# Patient Record
Sex: MALE | Race: BLACK OR AFRICAN AMERICAN | Employment: OTHER | URBAN - METROPOLITAN AREA
[De-identification: names, ages, dates, MRNs, and addresses within clinical notes are randomized per-mention and may not be internally consistent; named-entity substitution may affect disease eponyms.]

---

## 2017-03-02 ENCOUNTER — HOSPITAL ENCOUNTER (EMERGENCY)
Age: 37
Discharge: HOME | End: 2017-03-02
Attending: STUDENT IN AN ORGANIZED HEALTH CARE EDUCATION/TRAINING PROGRAM
Payer: COMMERCIAL

## 2017-03-02 VITALS
BODY MASS INDEX: 37.51 KG/M2 | HEART RATE: 83 BPM | RESPIRATION RATE: 17 BRPM | DIASTOLIC BLOOD PRESSURE: 97 MMHG | TEMPERATURE: 97.9 F | WEIGHT: 283 LBS | SYSTOLIC BLOOD PRESSURE: 164 MMHG | HEIGHT: 73 IN | OXYGEN SATURATION: 99 %

## 2017-03-02 DIAGNOSIS — R60.9 PERIPHERAL EDEMA: Primary | ICD-10-CM

## 2017-03-02 DIAGNOSIS — R35.0 URINARY FREQUENCY: ICD-10-CM

## 2017-03-02 DIAGNOSIS — F17.200 TOBACCO DEPENDENCE: ICD-10-CM

## 2017-03-02 LAB
CHP ED QC CHECK: YES
GLUCOSE BLD-MCNC: 92 MG/DL
GLUCOSE BLD-MCNC: 92 MG/DL (ref 60–115)
PERFORMED ON: NORMAL

## 2017-03-02 PROCEDURE — 99283 EMERGENCY DEPT VISIT LOW MDM: CPT

## 2017-03-02 RX ORDER — NAPROXEN 500 MG/1
500 TABLET ORAL 2 TIMES DAILY
Qty: 20 TABLET | Refills: 0 | Status: SHIPPED | OUTPATIENT
Start: 2017-03-02 | End: 2017-06-22

## 2017-03-02 ASSESSMENT — ENCOUNTER SYMPTOMS
TROUBLE SWALLOWING: 0
VOMITING: 0
DIARRHEA: 0
BACK PAIN: 0
ABDOMINAL PAIN: 0
SINUS PRESSURE: 0
COUGH: 0
CHEST TIGHTNESS: 0
SHORTNESS OF BREATH: 0

## 2017-06-22 ENCOUNTER — APPOINTMENT (OUTPATIENT)
Dept: GENERAL RADIOLOGY | Age: 37
End: 2017-06-22
Payer: COMMERCIAL

## 2017-06-22 ENCOUNTER — HOSPITAL ENCOUNTER (EMERGENCY)
Age: 37
Discharge: HOME | End: 2017-06-22
Payer: COMMERCIAL

## 2017-06-22 VITALS
OXYGEN SATURATION: 99 % | DIASTOLIC BLOOD PRESSURE: 83 MMHG | SYSTOLIC BLOOD PRESSURE: 122 MMHG | BODY MASS INDEX: 38.3 KG/M2 | HEART RATE: 95 BPM | WEIGHT: 289 LBS | TEMPERATURE: 98 F | HEIGHT: 73 IN | RESPIRATION RATE: 16 BRPM

## 2017-06-22 DIAGNOSIS — M25.512 ACUTE PAIN OF LEFT SHOULDER: Primary | ICD-10-CM

## 2017-06-22 PROCEDURE — 6370000000 HC RX 637 (ALT 250 FOR IP): Performed by: PHYSICIAN ASSISTANT

## 2017-06-22 PROCEDURE — 73030 X-RAY EXAM OF SHOULDER: CPT

## 2017-06-22 PROCEDURE — 99283 EMERGENCY DEPT VISIT LOW MDM: CPT

## 2017-06-22 RX ORDER — NAPROXEN 500 MG/1
500 TABLET ORAL ONCE
Status: COMPLETED | OUTPATIENT
Start: 2017-06-22 | End: 2017-06-22

## 2017-06-22 RX ORDER — NAPROXEN 500 MG/1
500 TABLET ORAL ONCE
Status: DISCONTINUED | OUTPATIENT
Start: 2017-06-22 | End: 2017-06-22 | Stop reason: HOSPADM

## 2017-06-22 RX ORDER — MELOXICAM 7.5 MG/1
7.5 TABLET ORAL DAILY
Qty: 14 TABLET | Refills: 0 | Status: SHIPPED | OUTPATIENT
Start: 2017-06-22 | End: 2020-04-26

## 2017-06-22 RX ADMIN — NAPROXEN 500 MG: 500 TABLET ORAL at 15:03

## 2017-06-22 ASSESSMENT — ENCOUNTER SYMPTOMS
EYES NEGATIVE: 1
RESPIRATORY NEGATIVE: 1
GASTROINTESTINAL NEGATIVE: 1

## 2017-06-22 ASSESSMENT — PAIN SCALES - GENERAL
PAINLEVEL_OUTOF10: 10
PAINLEVEL_OUTOF10: 10

## 2017-06-22 ASSESSMENT — PAIN DESCRIPTION - LOCATION: LOCATION: SHOULDER

## 2017-06-22 ASSESSMENT — PAIN DESCRIPTION - ORIENTATION: ORIENTATION: RIGHT;LEFT

## 2017-06-22 ASSESSMENT — PAIN DESCRIPTION - PAIN TYPE: TYPE: ACUTE PAIN

## 2017-06-22 ASSESSMENT — PAIN DESCRIPTION - DESCRIPTORS: DESCRIPTORS: PINS AND NEEDLES

## 2017-11-22 ENCOUNTER — HOSPITAL ENCOUNTER (EMERGENCY)
Age: 37
Discharge: HOME | End: 2017-11-22
Payer: COMMERCIAL

## 2017-11-22 VITALS
HEART RATE: 101 BPM | RESPIRATION RATE: 20 BRPM | TEMPERATURE: 97.9 F | WEIGHT: 270 LBS | DIASTOLIC BLOOD PRESSURE: 89 MMHG | SYSTOLIC BLOOD PRESSURE: 138 MMHG | OXYGEN SATURATION: 97 % | BODY MASS INDEX: 35.78 KG/M2 | HEIGHT: 73 IN

## 2017-11-22 DIAGNOSIS — M54.6 ACUTE LEFT-SIDED THORACIC BACK PAIN: Primary | ICD-10-CM

## 2017-11-22 PROCEDURE — 96372 THER/PROPH/DIAG INJ SC/IM: CPT

## 2017-11-22 PROCEDURE — 6370000000 HC RX 637 (ALT 250 FOR IP): Performed by: NURSE PRACTITIONER

## 2017-11-22 PROCEDURE — 99283 EMERGENCY DEPT VISIT LOW MDM: CPT

## 2017-11-22 PROCEDURE — 6360000002 HC RX W HCPCS: Performed by: NURSE PRACTITIONER

## 2017-11-22 RX ORDER — KETOROLAC TROMETHAMINE 30 MG/ML
60 INJECTION, SOLUTION INTRAMUSCULAR; INTRAVENOUS ONCE
Status: COMPLETED | OUTPATIENT
Start: 2017-11-22 | End: 2017-11-22

## 2017-11-22 RX ORDER — TRAMADOL HYDROCHLORIDE 50 MG/1
50 TABLET ORAL EVERY 4 HOURS PRN
Qty: 10 TABLET | Refills: 0 | Status: SHIPPED | OUTPATIENT
Start: 2017-11-22 | End: 2017-12-02

## 2017-11-22 RX ORDER — ORPHENADRINE CITRATE 30 MG/ML
60 INJECTION INTRAMUSCULAR; INTRAVENOUS ONCE
Status: COMPLETED | OUTPATIENT
Start: 2017-11-22 | End: 2017-11-22

## 2017-11-22 RX ORDER — ASPIRIN 81 MG
1 TABLET,CHEWABLE ORAL 3 TIMES DAILY PRN
Qty: 42.5 G | Refills: 0 | Status: SHIPPED | OUTPATIENT
Start: 2017-11-22

## 2017-11-22 RX ORDER — IBUPROFEN 800 MG/1
800 TABLET ORAL EVERY 8 HOURS PRN
Qty: 20 TABLET | Refills: 0 | Status: SHIPPED | OUTPATIENT
Start: 2017-11-22 | End: 2020-04-26

## 2017-11-22 RX ORDER — CYCLOBENZAPRINE HCL 10 MG
10 TABLET ORAL 3 TIMES DAILY PRN
Qty: 10 TABLET | Refills: 0 | Status: SHIPPED | OUTPATIENT
Start: 2017-11-22 | End: 2017-12-02

## 2017-11-22 RX ORDER — OXYCODONE HYDROCHLORIDE AND ACETAMINOPHEN 5; 325 MG/1; MG/1
1 TABLET ORAL ONCE
Status: COMPLETED | OUTPATIENT
Start: 2017-11-22 | End: 2017-11-22

## 2017-11-22 RX ADMIN — OXYCODONE HYDROCHLORIDE AND ACETAMINOPHEN 1 TABLET: 5; 325 TABLET ORAL at 09:25

## 2017-11-22 RX ADMIN — KETOROLAC TROMETHAMINE 60 MG: 30 INJECTION, SOLUTION INTRAMUSCULAR at 09:25

## 2017-11-22 RX ADMIN — ORPHENADRINE CITRATE 60 MG: 30 INJECTION INTRAMUSCULAR; INTRAVENOUS at 09:25

## 2017-11-22 ASSESSMENT — ENCOUNTER SYMPTOMS
BACK PAIN: 1
VOMITING: 0
DIARRHEA: 0
SHORTNESS OF BREATH: 0
COUGH: 0
NAUSEA: 0
ABDOMINAL PAIN: 0

## 2017-11-22 ASSESSMENT — PAIN DESCRIPTION - DESCRIPTORS: DESCRIPTORS: STABBING

## 2017-11-22 ASSESSMENT — PAIN DESCRIPTION - PAIN TYPE
TYPE: ACUTE PAIN
TYPE: ACUTE PAIN

## 2017-11-22 ASSESSMENT — PAIN DESCRIPTION - PROGRESSION: CLINICAL_PROGRESSION: GRADUALLY IMPROVING

## 2017-11-22 ASSESSMENT — PAIN DESCRIPTION - LOCATION
LOCATION: BACK;NECK
LOCATION: BACK

## 2017-11-22 ASSESSMENT — PAIN SCALES - GENERAL
PAINLEVEL_OUTOF10: 8
PAINLEVEL_OUTOF10: 4
PAINLEVEL_OUTOF10: 10
PAINLEVEL_OUTOF10: 10

## 2017-11-22 NOTE — ED TRIAGE NOTES
Pt c/o waking up a few days ago and having neck pain that moves down in the left mid back. Lump noted on the back. Pt denies injury. States he fell asleep with his baby in his arms and woke up  With the pain.  Pt unable to work as movement and taking a deep breath  Causes increase in pain

## 2017-11-22 NOTE — ED PROVIDER NOTES
new or concerning symptoms. Patient demonstrates understanding. PROCEDURES:    Procedures      FINAL IMPRESSION      1. Acute left-sided thoracic back pain          DISPOSITION/PLAN   DISPOSITION Decision to Discharge    PATIENT REFERRED TO:  Clarisse Chandler MD  [address] [place] [address]  [phone]            DISCHARGE MEDICATIONS:  New Prescriptions    CAPSAICIN 0.1 % CREA    Apply 1 applicator topically 3 times daily as needed (pain)    CYCLOBENZAPRINE (FLEXERIL) 10 MG TABLET    Take 1 tablet by mouth 3 times daily as needed for Muscle spasms    IBUPROFEN (ADVIL;MOTRIN) 800 MG TABLET    Take 1 tablet by mouth every 8 hours as needed for Pain    TRAMADOL (ULTRAM) 50 MG TABLET    Take 1 tablet by mouth every 4 hours as needed for Pain (MAY TAKE 2 TABS EVERY 6 HOURS FOR SEVERE PAIN) .        (Please note that portions of this note were completed with a voice recognition program.  Efforts were made to edit the dictations but occasionally words are mis-transcribed.)    Bulmaro Mosqueda, [address] [facility], [facility]  11/22/17 0581

## 2020-01-20 ENCOUNTER — HOSPITAL ENCOUNTER (EMERGENCY)
Age: 40
Discharge: HOME | End: 2020-01-20
Attending: EMERGENCY MEDICINE

## 2020-01-20 ENCOUNTER — APPOINTMENT (OUTPATIENT)
Dept: GENERAL RADIOLOGY | Age: 40
End: 2020-01-20

## 2020-01-20 VITALS
DIASTOLIC BLOOD PRESSURE: 84 MMHG | OXYGEN SATURATION: 98 % | HEIGHT: 73 IN | HEART RATE: 93 BPM | TEMPERATURE: 97.3 F | RESPIRATION RATE: 18 BRPM | SYSTOLIC BLOOD PRESSURE: 155 MMHG | WEIGHT: 270 LBS | BODY MASS INDEX: 35.78 KG/M2

## 2020-01-20 PROCEDURE — 99283 EMERGENCY DEPT VISIT LOW MDM: CPT

## 2020-01-20 PROCEDURE — 73630 X-RAY EXAM OF FOOT: CPT

## 2020-01-20 ASSESSMENT — PAIN SCALES - GENERAL: PAINLEVEL_OUTOF10: 10

## 2020-01-20 ASSESSMENT — PAIN DESCRIPTION - LOCATION: LOCATION: FOOT

## 2020-01-20 ASSESSMENT — ENCOUNTER SYMPTOMS
RHINORRHEA: 0
SHORTNESS OF BREATH: 0
ABDOMINAL PAIN: 0
EYE DISCHARGE: 0
FACIAL SWELLING: 0
COLOR CHANGE: 0
VOMITING: 0

## 2020-01-20 ASSESSMENT — PAIN DESCRIPTION - PAIN TYPE: TYPE: ACUTE PAIN

## 2020-01-20 ASSESSMENT — PAIN DESCRIPTION - ORIENTATION: ORIENTATION: LEFT

## 2020-01-20 NOTE — ED PROVIDER NOTES
[facility] [facility] ED  eMERGENCY dEPARTMENT eNCOUnter      Pt Name: Win Mac  MRN: [id number]  [place] 1980  Date of evaluation: 1/20/2020  Provider: Bonita Lees, [address] [place] [address]       Chief Complaint   Patient presents with    Foot Pain     left         HISTORY OF PRESENT ILLNESS   (Location/Symptom, Timing/Onset,Context/Setting, Quality, Duration, Modifying Factors, Severity)  Note limiting factors. Win Mac is a 35 y.o. male who presents to the emergency department with a chief complaint of left foot pain. He has known fungal infection between the fourth and fifth digits of his left foot and saw podiatry for last week. He is having pain in this area. He states that there has been swelling there and he feels like there is still it is. Overall he feels like the open area between his toes there is getting better. He has been following instructions as per podiatry. He is tender in this area however and states that the last time they gave him a boot and this took the pressure off the area and he felt like it healed faster and he was not in pain. He is asking for the same as he cannot find the boot any longer. He has been using a sandal because he cannot fit his foot into his shoe right now and wrapping it and plastic because of the snow. HPI    NursingNotes were reviewed. REVIEW OF SYSTEMS    (2-9 systems for level 4, 10 or more for level 5)     Review of Systems   Constitutional: Negative for activity change, appetite change and fatigue. HENT: Negative for congestion, facial swelling and rhinorrhea. Eyes: Negative for discharge. Respiratory: Negative for shortness of breath. Cardiovascular: Negative for chest pain. Gastrointestinal: Negative for abdominal pain and vomiting. Endocrine: Negative for cold intolerance. Musculoskeletal: Positive for gait problem. Negative for arthralgias and myalgias. Skin: Negative for color change. Allergic/Immunologic: Negative for environmental allergies. Neurological: Negative for dizziness and light-headedness. Hematological: Negative for adenopathy. Psychiatric/Behavioral: Negative for behavioral problems. Except as noted above the remainder of the review of systems was reviewed and negative. PAST MEDICAL HISTORY   History reviewed. No pertinent past medical history. SURGICALHISTORY     History reviewed. No pertinent surgical history. CURRENT MEDICATIONS       Previous Medications    CAPSAICIN 0.1 % CREA    Apply 1 applicator topically 3 times daily as needed (pain)    IBUPROFEN (ADVIL;MOTRIN) 800 MG TABLET    Take 1 tablet by mouth every 8 hours as needed for Pain    MELOXICAM (MOBIC) 7.5 MG TABLET    Take 1 tablet by mouth daily       ALLERGIES     Lactose intolerance (gi)    FAMILY HISTORY     History reviewed. No pertinent family history.        SOCIAL HISTORY       Social History     Socioeconomic History    Marital status: Single     Spouse name: None    Number of children: None    Years of education: None    Highest education level: None   Occupational History    None   Social Needs    Financial resource strain: None    Food insecurity:     Worry: None     Inability: None    Transportation needs:     Medical: None     Non-medical: None   Tobacco Use    Smoking status: Current Every Day Smoker     Packs/day: 0.50     Types: Cigarettes   Substance and Sexual Activity    Alcohol use: Yes     Comment: socially    Drug use: Yes     Frequency: 7.0 times per week     Types: Marijuana    Sexual activity: Yes     Partners: Female   Lifestyle    Physical activity:     Days per week: None     Minutes per session: None    Stress: None   Relationships    Social connections:     Talks on phone: None     Gets together: None     Attends [employer] service: None     Active member of club or organization: None     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        RADIOLOGY:   Ainsley Mor such as CT, Ultrasound and MRI are read by the radiologist. Plain radiographic images are visualized and preliminarily interpreted by the emergency physician with the below findings:    No acute findings per my read    Interpretation per the Radiologist below, if available at the time ofthis note:    XR FOOT LEFT (MIN 3 VIEWS)    (Results Pending)         ED BEDSIDE ULTRASOUND:   Performed by ED Physician - none    LABS:  Labs Reviewed - No data to display    All other labs were within normal range or not returned as of this dictation. EMERGENCY DEPARTMENT COURSE and DIFFERENTIAL DIAGNOSIS/MDM:   Vitals:    Vitals:    01/20/20 0712   BP: (!) 155/84   Pulse: 93   Resp: 18   Temp: 97.3 °F (36.3 °C)   TempSrc: Oral   SpO2: 98%   Weight: 270 lb (122.5 kg)   Height: 6' 1\" (1.854 m)       Notes are reviewed from the podiatry visit recently and their plan is reviewed with the patient, he seems to be following it accordingly. His symptoms seem to be improving rather than worsening, I feel that he is just seeking support regarding the pain and ambulating during the healing process. He is not asking for pain medicine. I will check an x-ray to be certain there is nothing unusual and provide him with a temporary solution of a post-op shoe for now. He has a plan to follow up with podiatry and can call them sooner for a better boot request should he need    MDM    CRITICAL CARE TIME   Total Critical Care time was 0 minutes, excluding separately reportableprocedures. There was a high probability of clinicallysignificant/life threatening deterioration in the patient's condition which required my urgent intervention. CONSULTS:  None    PROCEDURES:  Unless otherwise noted below, none     Procedures    FINAL IMPRESSION      1.  Left foot pain          DISPOSITION/PLAN   DISPOSITION Decision To Discharge 01/20/2020 08:03:55 AM      PATIENT REFERRED TO:    podiatry as

## 2020-01-20 NOTE — ED NOTES
Patient given DC instructions and education  Post Op boot applied to left foot  Patient to follow with podiatry  Denies having any questions at time of DC  Up with steady gait      Pasquale Mera RN  01/20/20 0630

## 2020-04-26 ENCOUNTER — HOSPITAL ENCOUNTER (EMERGENCY)
Age: 40
Discharge: HOME | End: 2020-04-26

## 2020-04-26 ENCOUNTER — APPOINTMENT (OUTPATIENT)
Dept: GENERAL RADIOLOGY | Age: 40
End: 2020-04-26

## 2020-04-26 VITALS
WEIGHT: 270 LBS | RESPIRATION RATE: 16 BRPM | OXYGEN SATURATION: 98 % | DIASTOLIC BLOOD PRESSURE: 93 MMHG | SYSTOLIC BLOOD PRESSURE: 163 MMHG | HEIGHT: 73 IN | TEMPERATURE: 98.4 F | BODY MASS INDEX: 35.78 KG/M2 | HEART RATE: 94 BPM

## 2020-04-26 LAB — URIC ACID, SERUM: 6.5 MG/DL (ref 3.4–7)

## 2020-04-26 PROCEDURE — 36415 COLL VENOUS BLD VENIPUNCTURE: CPT

## 2020-04-26 PROCEDURE — 6370000000 HC RX 637 (ALT 250 FOR IP): Performed by: PHYSICIAN ASSISTANT

## 2020-04-26 PROCEDURE — 99283 EMERGENCY DEPT VISIT LOW MDM: CPT

## 2020-04-26 PROCEDURE — 84550 ASSAY OF BLOOD/URIC ACID: CPT

## 2020-04-26 PROCEDURE — 73610 X-RAY EXAM OF ANKLE: CPT

## 2020-04-26 PROCEDURE — 73630 X-RAY EXAM OF FOOT: CPT

## 2020-04-26 RX ORDER — NAPROXEN 500 MG/1
500 TABLET ORAL 2 TIMES DAILY WITH MEALS
Qty: 14 TABLET | Refills: 0 | Status: SHIPPED | OUTPATIENT
Start: 2020-04-26

## 2020-04-26 RX ORDER — NAPROXEN 500 MG/1
500 TABLET ORAL ONCE
Status: COMPLETED | OUTPATIENT
Start: 2020-04-26 | End: 2020-04-26

## 2020-04-26 RX ADMIN — NAPROXEN 500 MG: 500 TABLET ORAL at 11:40

## 2020-04-26 ASSESSMENT — PAIN SCALES - GENERAL
PAINLEVEL_OUTOF10: 10
PAINLEVEL_OUTOF10: 10
PAINLEVEL_OUTOF10: 5

## 2020-04-26 ASSESSMENT — ENCOUNTER SYMPTOMS
EYES NEGATIVE: 1
GASTROINTESTINAL NEGATIVE: 1
RESPIRATORY NEGATIVE: 1

## 2020-04-26 ASSESSMENT — PAIN DESCRIPTION - ORIENTATION
ORIENTATION: RIGHT
ORIENTATION: RIGHT

## 2020-04-26 ASSESSMENT — PAIN DESCRIPTION - ONSET: ONSET: AWAKENED FROM SLEEP

## 2020-04-26 ASSESSMENT — PAIN DESCRIPTION - LOCATION
LOCATION: ANKLE
LOCATION: ANKLE

## 2020-04-26 ASSESSMENT — PAIN DESCRIPTION - DESCRIPTORS
DESCRIPTORS: ACHING
DESCRIPTORS: ACHING

## 2020-04-26 ASSESSMENT — PAIN DESCRIPTION - FREQUENCY
FREQUENCY: CONTINUOUS
FREQUENCY: CONTINUOUS

## 2020-04-26 ASSESSMENT — PAIN DESCRIPTION - PAIN TYPE
TYPE: ACUTE PAIN
TYPE: ACUTE PAIN

## 2020-04-26 NOTE — ED PROVIDER NOTES
Tenderness: There is no abdominal tenderness. Musculoskeletal: Normal range of motion. Right ankle: He exhibits swelling. Tenderness. Lateral malleolus tenderness found. Right foot: Tenderness and bony tenderness present. Skin:     General: Skin is warm and dry. Findings: No erythema or rash. Neurological:      Mental Status: He is alert and oriented to person, place, and time. Cranial Nerves: No cranial nerve deficit. Psychiatric:         Judgment: Judgment normal.           All other labs were within normal range or not returned as of this dictation. EMERGENCY DEPARTMENT COURSE and DIFFERENTIALDIAGNOSIS/MDM:   Vitals:    Vitals:    04/26/20 1119   BP: (!) 163/93   Pulse: 94   Resp: 16   Temp: 98.4 °F (36.9 °C)   TempSrc: Oral   SpO2: 98%   Weight: 270 lb (122.5 kg)   Height: 6' 1\" (1.854 m)          Patient given Naprosyn for pain while in the emergency room for initial treatment. Uric acid level is within normal limits. X-ray is negative for acute abnormality. Patient will be sent home on ankle brace and crutches for stabilization and comfort. Follow-up with orthopedics if symptoms persist.  Return here if symptoms worsen or if new concerning symptoms arise. Patient verbalizes understanding of plan discharge and has no further questions. PROCEDURES:  Unless otherwise noted below, none     Procedures      FINAL IMPRESSION      1. Sprain of right ankle, unspecified ligament, initial encounter    2.  Foot sprain, left, initial encounter          DISPOSITION/PLAN   DISPOSITION Discharge - Pending Orders Complete 04/26/2020 12:25:30 PM          Ashley Sharif PA-C (electronically signed)  Attending Emergency Physician  [address] [facility]  04/26/20 1257

## 2020-04-26 NOTE — ED NOTES
Air cast applied per orders, MSPs intact, crutch demo given and returned correctly,      Debi Reynolds, RN  04/26/20 6690

## 2023-04-06 ENCOUNTER — OFFICE VISIT (OUTPATIENT)
Dept: FAMILY MEDICINE CLINIC | Age: 43
End: 2023-04-06
Payer: COMMERCIAL

## 2023-04-06 VITALS
HEART RATE: 94 BPM | RESPIRATION RATE: 16 BRPM | SYSTOLIC BLOOD PRESSURE: 118 MMHG | BODY MASS INDEX: 37.24 KG/M2 | WEIGHT: 281 LBS | DIASTOLIC BLOOD PRESSURE: 70 MMHG | HEIGHT: 73 IN | OXYGEN SATURATION: 98 % | TEMPERATURE: 97.5 F

## 2023-04-06 DIAGNOSIS — Z87.891 PERSONAL HISTORY OF TOBACCO USE, PRESENTING HAZARDS TO HEALTH: ICD-10-CM

## 2023-04-06 DIAGNOSIS — Z00.00 ENCOUNTER FOR WELL ADULT EXAM WITHOUT ABNORMAL FINDINGS: Primary | ICD-10-CM

## 2023-04-06 DIAGNOSIS — R74.8 ABNORMAL LEVELS OF OTHER SERUM ENZYMES: ICD-10-CM

## 2023-04-06 DIAGNOSIS — B35.3 TINEA PEDIS OF BOTH FEET: ICD-10-CM

## 2023-04-06 PROBLEM — G89.29 CHRONIC PAIN OF BOTH SHOULDERS: Status: ACTIVE | Noted: 2020-02-11

## 2023-04-06 PROBLEM — M25.511 CHRONIC PAIN OF BOTH SHOULDERS: Status: ACTIVE | Noted: 2020-02-11

## 2023-04-06 PROBLEM — M25.512 CHRONIC PAIN OF BOTH SHOULDERS: Status: ACTIVE | Noted: 2020-02-11

## 2023-04-06 LAB
ALBUMIN SERPL-MCNC: 4.1 G/DL (ref 3.5–5.2)
ALP SERPL-CCNC: 96 U/L (ref 40–129)
ALT SERPL-CCNC: 43 U/L (ref 0–40)
ANION GAP SERPL CALCULATED.3IONS-SCNC: 9 MMOL/L (ref 7–16)
AST SERPL-CCNC: 40 U/L (ref 0–39)
BILIRUB SERPL-MCNC: <0.2 MG/DL (ref 0–1.2)
BUN SERPL-MCNC: 16 MG/DL (ref 6–20)
CALCIUM SERPL-MCNC: 9.2 MG/DL (ref 8.6–10.2)
CHLORIDE SERPL-SCNC: 106 MMOL/L (ref 98–107)
CHOLESTEROL, FASTING: 188 MG/DL (ref 0–199)
CO2 SERPL-SCNC: 23 MMOL/L (ref 22–29)
CREAT SERPL-MCNC: 1.3 MG/DL (ref 0.7–1.2)
ERYTHROCYTE [DISTWIDTH] IN BLOOD BY AUTOMATED COUNT: 12.7 FL (ref 11.5–15)
GLUCOSE FASTING: 104 MG/DL (ref 74–99)
HCT VFR BLD AUTO: 48.8 % (ref 37–54)
HDLC SERPL-MCNC: 49 MG/DL
HGB BLD-MCNC: 14.6 G/DL (ref 12.5–16.5)
LDL CHOLESTEROL CALCULATED: 108 MG/DL (ref 0–99)
MCH RBC QN AUTO: 32.3 PG (ref 26–35)
MCHC RBC AUTO-ENTMCNC: 29.9 % (ref 32–34.5)
MCV RBC AUTO: 108 FL (ref 80–99.9)
PLATELET # BLD AUTO: 180 E9/L (ref 130–450)
PMV BLD AUTO: 13 FL (ref 7–12)
POTASSIUM SERPL-SCNC: 4.6 MMOL/L (ref 3.5–5)
PROT SERPL-MCNC: 7 G/DL (ref 6.4–8.3)
RBC # BLD AUTO: 4.52 E12/L (ref 3.8–5.8)
SODIUM SERPL-SCNC: 138 MMOL/L (ref 132–146)
TRIGLYCERIDE, FASTING: 156 MG/DL (ref 0–149)
TSH SERPL-MCNC: 1.44 UIU/ML (ref 0.27–4.2)
VLDLC SERPL CALC-MCNC: 31 MG/DL
WBC # BLD: 4.7 E9/L (ref 4.5–11.5)

## 2023-04-06 PROCEDURE — 99386 PREV VISIT NEW AGE 40-64: CPT | Performed by: STUDENT IN AN ORGANIZED HEALTH CARE EDUCATION/TRAINING PROGRAM

## 2023-04-06 RX ORDER — NYSTATIN 100000 [USP'U]/G
1 POWDER TOPICAL 4 TIMES DAILY
COMMUNITY
Start: 2021-01-04 | End: 2023-04-06 | Stop reason: SDUPTHER

## 2023-04-06 RX ORDER — NYSTATIN 100000 [USP'U]/G
POWDER TOPICAL 4 TIMES DAILY
Qty: 60 G | Refills: 3 | Status: SHIPPED | OUTPATIENT
Start: 2023-04-06

## 2023-04-06 SDOH — ECONOMIC STABILITY: FOOD INSECURITY: WITHIN THE PAST 12 MONTHS, YOU WORRIED THAT YOUR FOOD WOULD RUN OUT BEFORE YOU GOT MONEY TO BUY MORE.: NEVER TRUE

## 2023-04-06 SDOH — ECONOMIC STABILITY: HOUSING INSECURITY
IN THE LAST 12 MONTHS, WAS THERE A TIME WHEN YOU DID NOT HAVE A STEADY PLACE TO SLEEP OR SLEPT IN A SHELTER (INCLUDING NOW)?: NO

## 2023-04-06 SDOH — ECONOMIC STABILITY: FOOD INSECURITY: WITHIN THE PAST 12 MONTHS, THE FOOD YOU BOUGHT JUST DIDN'T LAST AND YOU DIDN'T HAVE MONEY TO GET MORE.: NEVER TRUE

## 2023-04-06 SDOH — ECONOMIC STABILITY: INCOME INSECURITY: HOW HARD IS IT FOR YOU TO PAY FOR THE VERY BASICS LIKE FOOD, HOUSING, MEDICAL CARE, AND HEATING?: NOT HARD AT ALL

## 2023-04-06 ASSESSMENT — ENCOUNTER SYMPTOMS
CHEST TIGHTNESS: 0
EYE PAIN: 0
BACK PAIN: 0
DIARRHEA: 0
COUGH: 0
ABDOMINAL PAIN: 0
SHORTNESS OF BREATH: 0
RHINORRHEA: 0
CONSTIPATION: 0
NAUSEA: 0
VOMITING: 0

## 2023-04-06 ASSESSMENT — PATIENT HEALTH QUESTIONNAIRE - PHQ9
SUM OF ALL RESPONSES TO PHQ QUESTIONS 1-9: 0
SUM OF ALL RESPONSES TO PHQ QUESTIONS 1-9: 0
1. LITTLE INTEREST OR PLEASURE IN DOING THINGS: 0
2. FEELING DOWN, DEPRESSED OR HOPELESS: 0
SUM OF ALL RESPONSES TO PHQ QUESTIONS 1-9: 0
SUM OF ALL RESPONSES TO PHQ9 QUESTIONS 1 & 2: 0
SUM OF ALL RESPONSES TO PHQ QUESTIONS 1-9: 0

## 2023-04-06 ASSESSMENT — LIFESTYLE VARIABLES
HOW MANY STANDARD DRINKS CONTAINING ALCOHOL DO YOU HAVE ON A TYPICAL DAY: 1 OR 2
HOW OFTEN DO YOU HAVE A DRINK CONTAINING ALCOHOL: MONTHLY OR LESS

## 2023-04-06 NOTE — PATIENT INSTRUCTIONS
choices, such as nicotine patches, gum, and lozenges. After you quit, do not use tobacco again, not even once. Get rid of all tobacco products and anything that reminds you of tobacco, such as ashtrays. Avoid things that make you reach for tobacco.  Change your daily routine. Take a different route to work, or eat a meal in a different place. Try to cut down on stress. Find ways to calm yourself, such as taking a hot bath or doing deep breathing exercises. Eat a healthy diet, and get regular exercise. Having healthy habits may help you quit using tobacco.     Don't give up on quitting if you use tobacco again. Most people quit and restart a few times before they quit for good. Follow-up care is a key part of your treatment and safety. Be sure to make and go to all appointments, and call your doctor if you are having problems. It's also a good idea to know your test results and keep a list of the medicines you take. Where can you learn more? Go to http://www.woods.com/ and enter Y522 to learn more about \"Quitting Tobacco: Care Instructions. \"  Current as of: August 2, 2022               Content Version: 13.6  © 2820-4504 Healthwise, [employer]. Care instructions adapted under license by [facility] ([facility]). If you have questions about a medical condition or this instruction, always ask your healthcare professional. [place] [address] any warranty or liability for your use of this information. Learning About Benefits From Quitting Smoking  Why is it important to quit smoking? If you're thinking about quitting smoking, you may have a few reasons to be smoke-free. Your health may be one of them. When you quit smoking, you lower your risk for many serious health problems, such as cancer, lung disease, heart attack, stroke, blood vessel disease, and blindness from macular degeneration. When you're smoke-free, you get sick less often, and you heal faster.

## 2023-04-06 NOTE — PROGRESS NOTES
Services Due: see orders and patient instructions/AVS.    Return in about 1 year (around 4/6/2024), or if symptoms worsen or fail to improve, for annual.        Obesity Counseling: Assessed behavioral health risks and factors affecting choice of behavior. Suggested weight control approaches, including dietary changes behavioral modification and follow up plan. Provided educational and support documentation.   Time spent (minutes): 3

## 2023-06-25 ENCOUNTER — HOSPITAL ENCOUNTER (EMERGENCY)
Age: 43
Discharge: HOME | End: 2023-06-25
Payer: COMMERCIAL

## 2023-06-25 ENCOUNTER — APPOINTMENT (OUTPATIENT)
Dept: GENERAL RADIOLOGY | Age: 43
End: 2023-06-25
Payer: COMMERCIAL

## 2023-06-25 VITALS
SYSTOLIC BLOOD PRESSURE: 140 MMHG | OXYGEN SATURATION: 96 % | WEIGHT: 282.8 LBS | HEART RATE: 89 BPM | HEIGHT: 73 IN | DIASTOLIC BLOOD PRESSURE: 91 MMHG | TEMPERATURE: 99 F | RESPIRATION RATE: 16 BRPM | BODY MASS INDEX: 37.48 KG/M2
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
## 2023-06-25 DIAGNOSIS — S60.222A CONTUSION OF LEFT HAND, INITIAL ENCOUNTER: Primary | ICD-10-CM

## 2023-06-25 PROCEDURE — 73130 X-RAY EXAM OF HAND: CPT

## 2023-06-25 PROCEDURE — 99283 EMERGENCY DEPT VISIT LOW MDM: CPT

## 2023-06-25 PROCEDURE — 6370000000 HC RX 637 (ALT 250 FOR IP): Performed by: PHYSICIAN ASSISTANT

## 2023-06-25 RX ORDER — NAPROXEN 250 MG/1
500 TABLET ORAL ONCE
Status: COMPLETED | OUTPATIENT
Start: 2023-06-25 | End: 2023-06-25

## 2023-06-25 RX ORDER — AMOXICILLIN AND CLAVULANATE POTASSIUM 875; 125 MG/1; MG/1
1 TABLET, FILM COATED ORAL 2 TIMES DAILY
Qty: 14 TABLET | Refills: 0 | Status: SHIPPED | OUTPATIENT
Start: 2023-06-25 | End: 2023-07-02

## 2023-06-25 RX ORDER — BACITRACIN ZINC 500 [USP'U]/G
OINTMENT TOPICAL ONCE
Status: COMPLETED | OUTPATIENT
Start: 2023-06-25 | End: 2023-06-25

## 2023-06-25 RX ORDER — NAPROXEN 500 MG/1
500 TABLET ORAL 2 TIMES DAILY
Qty: 60 TABLET | Refills: 0 | Status: SHIPPED | OUTPATIENT
Start: 2023-06-25

## 2023-06-25 RX ORDER — HYDROCODONE BITARTRATE AND ACETAMINOPHEN 5; 325 MG/1; MG/1
1 TABLET ORAL ONCE
Status: COMPLETED | OUTPATIENT
Start: 2023-06-25 | End: 2023-06-25

## 2023-06-25 RX ORDER — AMOXICILLIN AND CLAVULANATE POTASSIUM 875; 125 MG/1; MG/1
1 TABLET, FILM COATED ORAL ONCE
Status: COMPLETED | OUTPATIENT
Start: 2023-06-25 | End: 2023-06-25

## 2023-06-25 RX ADMIN — HYDROCODONE BITARTRATE AND ACETAMINOPHEN 1 TABLET: 5; 325 TABLET ORAL at 14:01

## 2023-06-25 RX ADMIN — BACITRACIN ZINC: 500 OINTMENT TOPICAL at 14:01

## 2023-06-25 RX ADMIN — NAPROXEN 500 MG: 250 TABLET ORAL at 14:01

## 2023-06-25 RX ADMIN — AMOXICILLIN AND CLAVULANATE POTASSIUM 1 TABLET: 875; 125 TABLET, FILM COATED ORAL at 14:01

## 2023-06-25 ASSESSMENT — PAIN SCALES - GENERAL: PAINLEVEL_OUTOF10: 10

## 2023-06-25 ASSESSMENT — PAIN DESCRIPTION - LOCATION: LOCATION: HAND

## 2023-06-25 ASSESSMENT — PAIN DESCRIPTION - DESCRIPTORS: DESCRIPTORS: SHARP

## 2023-06-25 ASSESSMENT — PAIN DESCRIPTION - ORIENTATION: ORIENTATION: LEFT

## 2023-10-20 ENCOUNTER — APPOINTMENT (OUTPATIENT)
Dept: GENERAL RADIOLOGY | Age: 43
End: 2023-10-20
Payer: COMMERCIAL

## 2023-10-20 ENCOUNTER — HOSPITAL ENCOUNTER (EMERGENCY)
Age: 43
Discharge: HOME | End: 2023-10-20
Payer: COMMERCIAL

## 2023-10-20 VITALS
DIASTOLIC BLOOD PRESSURE: 70 MMHG | HEART RATE: 88 BPM | OXYGEN SATURATION: 98 % | BODY MASS INDEX: 37.6 KG/M2 | WEIGHT: 285 LBS | RESPIRATION RATE: 18 BRPM | TEMPERATURE: 98 F | SYSTOLIC BLOOD PRESSURE: 138 MMHG

## 2023-10-20 DIAGNOSIS — S67.22XA CRUSHING INJURY OF LEFT HAND, INITIAL ENCOUNTER: ICD-10-CM

## 2023-10-20 DIAGNOSIS — S60.222A CONTUSION OF LEFT HAND, INITIAL ENCOUNTER: ICD-10-CM

## 2023-10-20 DIAGNOSIS — S62.397A CLOSED DISPLACED FRACTURE OF OTHER PART OF FIFTH METACARPAL BONE OF LEFT HAND, INITIAL ENCOUNTER: Primary | ICD-10-CM

## 2023-10-20 PROCEDURE — 6370000000 HC RX 637 (ALT 250 FOR IP): Performed by: PHYSICIAN ASSISTANT

## 2023-10-20 PROCEDURE — 99283 EMERGENCY DEPT VISIT LOW MDM: CPT

## 2023-10-20 PROCEDURE — 29125 APPL SHORT ARM SPLINT STATIC: CPT

## 2023-10-20 PROCEDURE — 73130 X-RAY EXAM OF HAND: CPT

## 2023-10-20 RX ORDER — NAPROXEN 500 MG/1
500 TABLET ORAL 2 TIMES DAILY
Qty: 60 TABLET | Refills: 0 | Status: SHIPPED | OUTPATIENT
Start: 2023-10-20

## 2023-10-20 RX ORDER — HYDROCODONE BITARTRATE AND ACETAMINOPHEN 5; 325 MG/1; MG/1
1 TABLET ORAL EVERY 6 HOURS PRN
Qty: 12 TABLET | Refills: 0 | Status: SHIPPED | OUTPATIENT
Start: 2023-10-20 | End: 2023-10-23

## 2023-10-20 RX ORDER — HYDROCODONE BITARTRATE AND ACETAMINOPHEN 5; 325 MG/1; MG/1
1 TABLET ORAL ONCE
Status: COMPLETED | OUTPATIENT
Start: 2023-10-20 | End: 2023-10-20

## 2023-10-20 RX ADMIN — HYDROCODONE BITARTRATE AND ACETAMINOPHEN 1 TABLET: 5; 325 TABLET ORAL at 09:58

## 2023-10-20 ASSESSMENT — PAIN DESCRIPTION - DESCRIPTORS: DESCRIPTORS: DISCOMFORT

## 2023-10-20 ASSESSMENT — PAIN SCALES - GENERAL
PAINLEVEL_OUTOF10: 10
PAINLEVEL_OUTOF10: 10

## 2023-10-20 ASSESSMENT — PAIN DESCRIPTION - ORIENTATION: ORIENTATION: LEFT

## 2023-10-20 ASSESSMENT — PAIN - FUNCTIONAL ASSESSMENT: PAIN_FUNCTIONAL_ASSESSMENT: 0-10

## 2023-10-20 ASSESSMENT — PAIN DESCRIPTION - LOCATION: LOCATION: HAND

## 2023-10-20 NOTE — ED PROVIDER NOTES
transcribed using voice recognition software. Every effort was made to ensure accuracy; however, inadvertent computerized transcription errors may be present.     END OF PROVIDER NOTE       Sara Holman PA-C  10/20/23 0443

## 2023-11-27 ENCOUNTER — HOSPITAL ENCOUNTER (OUTPATIENT)
Dept: OCCUPATIONAL THERAPY | Age: 43
Setting detail: THERAPIES SERIES
Discharge: HOME | End: 2023-11-27
Payer: COMMERCIAL

## 2023-11-27 PROCEDURE — 97110 THERAPEUTIC EXERCISES: CPT

## 2023-11-27 PROCEDURE — 97165 OT EVAL LOW COMPLEX 30 MIN: CPT

## 2023-11-27 NOTE — PROGRESS NOTES
OCCUPATIONAL THERAPY INITIAL EVALUATION    [facility] THERAPY  [address] [place] [address]  Dept: [phone]  Loc: [address] [place]  MAIN OT fax [phone]    Date:  2023  Initial Evaluation Date: 23   Evaluating Therapist: Mary Villaseñor OT    Patient Name:  Unknown Labor    :  1980    Restrictions/Precautions: Follow conservative metacarpal neck fx protocol, low fall risk  Diagnosis:  S62.337A Displaced fracture of neck of fifth metacarpal bone, left hand, initial encounter for closed       Date of Surgery/Injury: 10/19/23 inj (6 weeks post)    Insurance/Certification information:  [address] [place] [address] (no auth)  Plan of care signed (Y/N): N  Visit# / total visits: -     Referring Practitioner:  Edilson TREJO  Specific Practitioner Orders: OT Eval & Treat    Assessment of current deficits   [] Functional mobility  [x] ADLs  [x] Strength   [] Cognition   [] Functional transfers   [x] IADLs  [] Safety Awareness  [x] Endurance   [x] Fine Motor Coordination  [] Balance  [] Vision/perception  [x] Sensation    [x] Gross Motor Coordination [x] ROM  [x] Pain   [x] Edema    [] Scar Adhesion/Skin Integrity     OT PLAN OF CARE   OT POC based on physician orders, patient diagnosis and results of clinical assessment    Frequency/Duration: 1- 2 / week for 12- 18 visits.    Certification period From: 23  To: 24    Specific OT Treatment to include:   [x] Instruction in HEP                   Modalities:  [x] Therapeutic Exercise        [x] Ultrasound               [] Electrical Stimulation/Attended  [x] PROM/Stretching                    [x] Fluidotherapy          [x]  Paraffin                   [x] AAROM  [x] AROM                 [] Iontophoresis:   [x] Tendon Glides                                               [] Neuromuscular Re-Ed            [] ADL/IADL re-training    [x] Therapeutic Activity       [x] Pain

## 2023-11-29 ENCOUNTER — HOSPITAL ENCOUNTER (OUTPATIENT)
Dept: OCCUPATIONAL THERAPY | Age: 43
Setting detail: THERAPIES SERIES
Discharge: HOME | End: 2023-11-29
Payer: COMMERCIAL

## 2023-11-29 PROCEDURE — 97140 MANUAL THERAPY 1/> REGIONS: CPT

## 2023-11-29 PROCEDURE — 97110 THERAPEUTIC EXERCISES: CPT

## 2023-11-29 PROCEDURE — 97018 PARAFFIN BATH THERAPY: CPT

## 2023-11-29 NOTE — PROGRESS NOTES
OCCUPATIONAL THERAPY DAILY NOTE  [facility] OCCUPATIONAL THERAPY  [address] [facility] [address]  Dept: [phone]  Loc: [address] MAIN OT fax [phone]      Date:  2023  Initial Evaluation Date: 23   Evaluating Therapist: Anjali Morrow OT    Patient Name:  Bhaskar Mcmahon    :  1980    strictions/Precautions: Follow conservative metacarpal neck fx protocol, low fall risk  Diagnosis:  S62.337A Displaced fracture of neck of fifth metacarpal bone, left hand, initial encounter for closed                                                       Date of Surgery/Injury: 10/19/23 inj (6 weeks post)     Insurance/Certification information:  [address] [place] [address] (no auth)  Plan of care signed (Y/N): N  Visit# / total visits: -      Referring Practitioner:  Susanna TREJO  Specific Practitioner Orders: OT Eval & Treat     Assessment of current deficits   [] Functional mobility             [x] ADLs          [x] Strength                 [] Cognition   [] Functional transfers           [x] IADLs         [] Safety Awareness  [x] Endurance   [x] Fine Motor Coordination    [] Balance      [] Vision/perception   [x] Sensation     [x] Gross Motor Coordination [x] ROM          [x] Pain                        [x] Edema          [] Scar Adhesion/Skin Integrity      OT PLAN OF CARE   OT POC based on physician orders, patient diagnosis and results of clinical assessment     Frequency/Duration: 1- 2 / week for 12- 18 visits.    Certification period From: 23  To: 24     Specific OT Treatment to include:   [x] Instruction in HEP                   Modalities:  [x] Therapeutic Exercise                 [x] Ultrasound               [] Electrical Stimulation/Attended  [x] PROM/Stretching                    [x] Fluidotherapy          [x]  Paraffin                   [x] AAROM  [x] AROM                 [] Iontophoresis:   [x] Tendon Glides

## 2023-12-04 ENCOUNTER — HOSPITAL ENCOUNTER (OUTPATIENT)
Dept: OCCUPATIONAL THERAPY | Age: 43
Setting detail: THERAPIES SERIES
Discharge: HOME | End: 2023-12-04
Payer: COMMERCIAL

## 2023-12-04 PROCEDURE — 97110 THERAPEUTIC EXERCISES: CPT

## 2023-12-04 PROCEDURE — 97140 MANUAL THERAPY 1/> REGIONS: CPT

## 2023-12-04 PROCEDURE — 97022 WHIRLPOOL THERAPY: CPT

## 2023-12-04 NOTE — PROGRESS NOTES
OCCUPATIONAL THERAPY DAILY NOTE  Y [facility] OCCUPATIONAL THERAPY  [address] [address]  Dept: [phone]  Loc: [address]  MAIN OT fax [phone]      Date:  2023  Initial Evaluation Date: 23   Evaluating Therapist: TACHO Glover    Patient Name:  Bhaskar Mcmahon    :  1980    strictions/Precautions: Follow conservative metacarpal neck fx protocol, low fall risk  Diagnosis:  S62.337A Displaced fracture of neck of fifth metacarpal bone, left hand, initial encounter for closed                                                       Date of Surgery/Injury: 10/19/23 inj (6 weeks post)     Insurance/Certification information:  [address] [place] [address] (no auth)  Plan of care signed (Y/N): N  Visit# / total visits: 3 / 12-      Referring Practitioner:  Susanna TREJO  Specific Practitioner Orders: OT Eval & Treat     Assessment of current deficits   [] Functional mobility             [x] ADLs          [x] Strength                 [] Cognition   [] Functional transfers           [x] IADLs         [] Safety Awareness  [x] Endurance   [x] Fine Motor Coordination    [] Balance      [] Vision/perception   [x] Sensation     [x] Gross Motor Coordination [x] ROM          [x] Pain                        [x] Edema          [] Scar Adhesion/Skin Integrity      OT PLAN OF CARE   OT POC based on physician orders, patient diagnosis and results of clinical assessment     Frequency/Duration: 1- 2 / week for 12- 18 visits.    Certification period From: 23  To: 24     Specific OT Treatment to include:   [x] Instruction in HEP                   Modalities:  [x] Therapeutic Exercise                 [x] Ultrasound               [] Electrical Stimulation/Attended  [x] PROM/Stretching                    [x] Fluidotherapy          [x]  Paraffin                   [x] AAROM  [x] AROM                 [] Iontophoresis:   [x] Tendon Glides

## 2023-12-06 ENCOUNTER — HOSPITAL ENCOUNTER (OUTPATIENT)
Dept: OCCUPATIONAL THERAPY | Age: 43
Setting detail: THERAPIES SERIES
Discharge: HOME | End: 2023-12-06
Payer: COMMERCIAL

## 2023-12-06 PROCEDURE — 97140 MANUAL THERAPY 1/> REGIONS: CPT

## 2023-12-06 PROCEDURE — 97018 PARAFFIN BATH THERAPY: CPT

## 2023-12-06 PROCEDURE — 97760 ORTHOTIC MGMT&TRAING 1ST ENC: CPT

## 2023-12-06 NOTE — PROGRESS NOTES
OCCUPATIONAL THERAPY DAILY NOTE  [facility] THERAPY  [address] [place] [address]  Dept: [phone]  Loc: [address] [place]  MAIN OT fax [phone]      Date:  2023  Initial Evaluation Date: 23   Evaluating Therapist: TACHO Rodriguez    Patient Name:  Nancie Fregoso    :  1980    strictions/Precautions: Follow conservative metacarpal neck fx protocol, low fall risk  Diagnosis:  S62.337A Displaced fracture of neck of fifth metacarpal bone, left hand, initial encounter for closed                                                       Date of Surgery/Injury: 10/19/23 inj (6 weeks post)     Insurance/Certification information:  [address] [place] [address] (no auth)  Plan of care signed (Y/N): N  Visit# / total visits: -      Referring Practitioner:  Murray TREJO  Specific Practitioner Orders: OT Eval & Treat     Assessment of current deficits   [] Functional mobility             [x] ADLs          [x] Strength                 [] Cognition   [] Functional transfers           [x] IADLs         [] Safety Awareness  [x] Endurance   [x] Fine Motor Coordination    [] Balance      [] Vision/perception   [x] Sensation     [x] Gross Motor Coordination [x] ROM          [x] Pain                        [x] Edema          [] Scar Adhesion/Skin Integrity      OT PLAN OF CARE   OT POC based on physician orders, patient diagnosis and results of clinical assessment     Frequency/Duration: 1- 2 / week for 12- 18 visits.    Certification period From: 23  To: 24     Specific OT Treatment to include:   [x] Instruction in HEP                   Modalities:  [x] Therapeutic Exercise                 [x] Ultrasound               [] Electrical Stimulation/Attended  [x] PROM/Stretching                    [x] Fluidotherapy          [x]  Paraffin                   [x] AAROM  [x] AROM                 [] Iontophoresis:   [x] Tendon Glides

## 2023-12-11 ENCOUNTER — HOSPITAL ENCOUNTER (OUTPATIENT)
Dept: OCCUPATIONAL THERAPY | Age: 43
Setting detail: THERAPIES SERIES
Discharge: HOME | End: 2023-12-11
Payer: COMMERCIAL

## 2023-12-11 PROCEDURE — 97110 THERAPEUTIC EXERCISES: CPT

## 2023-12-11 PROCEDURE — 97018 PARAFFIN BATH THERAPY: CPT

## 2023-12-11 PROCEDURE — 97140 MANUAL THERAPY 1/> REGIONS: CPT

## 2023-12-11 NOTE — PROGRESS NOTES
[address] Manual Therapy 20 1   [address]285 ADL/COMP Tech Train     [address] Orthotic Management/Training      Other                 Total  60 4       Plan: OT 1- 2 / week for 12- 18 visits. Certification period From: 11/27/23  To: 2/27/24    [x]  Continues Plan of care with focus on pain, edema, P/AROM, hypersensitivity, and eventually strength: Treatment covered based on POC and graduated to patient's progress. Pt education continues at each visit to obtain maximum benefits from skilled OT intervention.   []  Alter Plan of care:   []  Discharge:    Abel Patten OTR/L [id number]

## 2023-12-13 ENCOUNTER — HOSPITAL ENCOUNTER (OUTPATIENT)
Dept: OCCUPATIONAL THERAPY | Age: 43
Setting detail: THERAPIES SERIES
Discharge: HOME | End: 2023-12-13
Payer: COMMERCIAL

## 2023-12-13 PROCEDURE — 97110 THERAPEUTIC EXERCISES: CPT

## 2023-12-13 PROCEDURE — 97018 PARAFFIN BATH THERAPY: CPT

## 2023-12-13 PROCEDURE — 97530 THERAPEUTIC ACTIVITIES: CPT

## 2023-12-13 PROCEDURE — 97140 MANUAL THERAPY 1/> REGIONS: CPT

## 2023-12-13 NOTE — PROGRESS NOTES
OCCUPATIONAL THERAPY DAILY NOTE  [facility] THERAPY  [address] [place] [address]  Dept: [phone]  Loc: [address]  MAIN OT fax [phone]      Date:  2023  Initial Evaluation Date: 23   Evaluating Therapist: Ave [address], OTR    Patient Name:  Lety aLmbert    :  1980    strictions/Precautions: Follow conservative metacarpal neck fx protocol, low fall risk  Diagnosis:  S62.337A Displaced fracture of neck of fifth metacarpal bone, left hand, initial encounter for closed                                                       Date of Surgery/Injury: 10/19/23 inj (6 weeks post)     Insurance/Certification information:  [address] [place] [address] (no auth)  Plan of care signed (Y/N): N  Visit# / total visits: -      Referring Practitioner:  Shamir TREJO  Specific Practitioner Orders: OT Eval & Treat     Assessment of current deficits   [] Functional mobility             [x] ADLs          [x] Strength                 [] Cognition   [] Functional transfers           [x] IADLs         [] Safety Awareness  [x] Endurance   [x] Fine Motor Coordination    [] Balance      [] Vision/perception   [x] Sensation     [x] Gross Motor Coordination [x] ROM          [x] Pain                        [x] Edema          [] Scar Adhesion/Skin Integrity      OT PLAN OF CARE   OT POC based on physician orders, patient diagnosis and results of clinical assessment     Frequency/Duration: 1- 2 / week for 12- 18 visits.    Certification period From: 23  To: 24     Specific OT Treatment to include:   [x] Instruction in HEP                   Modalities:  [x] Therapeutic Exercise                 [x] Ultrasound               [] Electrical Stimulation/Attended  [x] PROM/Stretching                    [x] Fluidotherapy          [x]  Paraffin                   [x] AAROM  [x] AROM                 [] Iontophoresis:   [x] Tendon Glides

## 2023-12-26 ENCOUNTER — HOSPITAL ENCOUNTER (OUTPATIENT)
Dept: OCCUPATIONAL THERAPY | Age: 43
Setting detail: THERAPIES SERIES
Discharge: HOME | End: 2023-12-26
Payer: COMMERCIAL

## 2023-12-26 PROCEDURE — 97018 PARAFFIN BATH THERAPY: CPT | Performed by: OCCUPATIONAL THERAPIST

## 2023-12-26 PROCEDURE — 97140 MANUAL THERAPY 1/> REGIONS: CPT | Performed by: OCCUPATIONAL THERAPIST

## 2023-12-26 PROCEDURE — 97110 THERAPEUTIC EXERCISES: CPT | Performed by: OCCUPATIONAL THERAPIST

## 2023-12-26 NOTE — PROGRESS NOTES
carrying 5# basket/bag with LUE without use of compensatory strategies  10) Patient will demonstrate improved functional activity tolerance from Poor to Fair for ADL/IADL completion. 11) Patient will decrease QuickDASH score to 25% or less for increased participation in daily functional activities. TODAY'S TREATMENT     Pain Level: 3/10 in the 4th digit and 5th MCP    Subjective: Pt reports no new changes. Objective:    Updated POC to be completed by 10th visit. INTERVENTION: COMPLETED: SPECIFICS/COMMENTS:   Modality:     Paraffin + MH X -To L hand and wrist + MH x 10 min to promote soft tissue extensibility   Fluidotherapy  *Affected UE: Completes x 15 min to promote tissue healing, skin desensitization, reduce inflammation, and decrease pain. Actively completed SROM in all available planes with holds at end range during treatment to facilitate improved functional ROM under direct supervision. AROM:     L hand                 X   -Digit adduction/abduction x 10  -Composite flexion and extension SF & RF x 10  -towel scrunches with digits x 10  -Extension lifts SF & RF x 10 ea  - in hand manipulation focusing on flexion of the 4th and 5th digit to hold marbles while translation from palm to index finger to place into container.    Wrist ex/flex using 2# wt ball for extension of palm 10 reps  Pro/Sup with digits stretch on 2# ball 10 reps  -Hook, fist, hook and extension x 10  -Composite fist around yellow (very light) resistive sponge x 10  -Tendon glides x10 reps  -MCP, PIP and DIP with blocking all digits x15 reps  -Opp pinching with marbles using SF x10 reps  -Table wraps x10 reps  -Flex SF to  pom poms   L wrist    -Flexion, Extension, UD, RD, and sup/pro x 10 ea  -Circumduction with composite fist x 10   AAROM:     L hand   X -Composite fist with AAROM and CRC x 10  -Hook fist AAROM then place and hold x 10 with emphasis on RF and SF  -AAROM digit 5 composite extension with place and hold

## 2023-12-28 ENCOUNTER — HOSPITAL ENCOUNTER (OUTPATIENT)
Dept: OCCUPATIONAL THERAPY | Age: 43
Setting detail: THERAPIES SERIES
Discharge: HOME | End: 2023-12-28
Payer: COMMERCIAL

## 2023-12-28 PROCEDURE — 97110 THERAPEUTIC EXERCISES: CPT | Performed by: OCCUPATIONAL THERAPIST

## 2023-12-28 PROCEDURE — 97140 MANUAL THERAPY 1/> REGIONS: CPT | Performed by: OCCUPATIONAL THERAPIST

## 2023-12-28 PROCEDURE — 97018 PARAFFIN BATH THERAPY: CPT | Performed by: OCCUPATIONAL THERAPIST

## 2023-12-28 NOTE — PROGRESS NOTES
OCCUPATIONAL THERAPY DAILY NOTE  Y [facility] OCCUPATIONAL THERAPY  [address] [place] [id number]  Dept: [phone]  Loc: [place] [place]  MAIN OT fax [phone]      Date:  2023  Initial Evaluation Date: 23   Evaluating Therapist: TACHO Juarez    Patient Name:  Nena Fitzgerald    :  1980    strictions/Precautions: Follow conservative metacarpal neck fx protocol, low fall risk  Diagnosis:  S62.337A Displaced fracture of neck of fifth metacarpal bone, left hand, initial encounter for closed    fractured the neck of the L fifth metacarpal                                                      Date of Surgery/Injury: 10/19/23 inj ( 9 weeks post)     Insurance/Certification information:  [address] [place] [address] (no auth)  Plan of care signed (Y/N): N  Visit# / total visits: -      Referring Practitioner:  Jodi TREJO  Specific Practitioner Orders: OT Eval & Treat     Assessment of current deficits   [] Functional mobility             [x] ADLs          [x] Strength                 [] Cognition   [] Functional transfers           [x] IADLs         [] Safety Awareness  [x] Endurance   [x] Fine Motor Coordination    [] Balance      [] Vision/perception   [x] Sensation     [x] Gross Motor Coordination [x] ROM          [x] Pain                        [x] Edema          [] Scar Adhesion/Skin Integrity      OT PLAN OF CARE   OT POC based on physician orders, patient diagnosis and results of clinical assessment     Frequency/Duration: 1- 2 / week for 12- 18 visits.    Certification period From: 23  To: 24     Specific OT Treatment to include:   [x] Instruction in HEP                   Modalities:  [x] Therapeutic Exercise                 [x] Ultrasound               [] Electrical Stimulation/Attended  [x] PROM/Stretching                    [x] Fluidotherapy          [x]  Paraffin                   [x] AAROM  [x] AROM                 []

## 2024-01-09 ENCOUNTER — HOSPITAL ENCOUNTER (OUTPATIENT)
Dept: OCCUPATIONAL THERAPY | Age: 44
Setting detail: THERAPIES SERIES
Discharge: HOME | End: 2024-01-09
Payer: COMMERCIAL

## 2024-01-09 PROCEDURE — 97018 PARAFFIN BATH THERAPY: CPT | Performed by: OCCUPATIONAL THERAPIST

## 2024-01-09 PROCEDURE — 97140 MANUAL THERAPY 1/> REGIONS: CPT | Performed by: OCCUPATIONAL THERAPIST

## 2024-01-09 PROCEDURE — 97110 THERAPEUTIC EXERCISES: CPT | Performed by: OCCUPATIONAL THERAPIST

## 2024-01-09 NOTE — PROGRESS NOTES
OCCUPATIONAL THERAPY PROGRESS UPDATE  [facility]  [address] [facility] [address]  Dept: [phone]  Loc: [phone]   SE[facility] MAIN OT fax [phone]      Date:  2024  Initial Evaluation Date: 23   Evaluating Therapist: TACHO Israel    Patient Name:  Sharita Sharma    :  1980    strictions/Precautions:  Follow conservative metacarpal neck fx protocol, low fall risk  Diagnosis:  S62.337A Displaced fracture of neck of fifth metacarpal bone, left hand, initial encounter for closed    fractured the neck of the L fifth metacarpal                                                      Date of Surgery/Injury: 10/19/23 inj (11 weeks post)     Insurance/Certification information:  [facility] (no auth)  Plan of care signed (Y/N): N  Visit# / total visits: 10 /12-      Referring Practitioner:  Olivier Enriquez  Specific Practitioner Orders: OT Eval & Treat     Assessment of current deficits   [] Functional mobility             [x] ADLs          [x] Strength                 [] Cognition   [] Functional transfers           [x] IADLs         [] Safety Awareness  [x] Endurance   [x] Fine Motor Coordination    [] Balance      [] Vision/perception   [x] Sensation     [x] Gross Motor Coordination [x] ROM          [x] Pain                        [x] Edema          [] Scar Adhesion/Skin Integrity      OT PLAN OF CARE   OT POC based on physician orders, patient diagnosis and results of clinical assessment     Frequency/Duration: 1- 2 / week for 12- 18 visits.   Certification period From: 23  To: 24     Specific OT Treatment to include:   [x] Instruction in HEP                   Modalities:  [x] Therapeutic Exercise                 [x] Ultrasound               [] Electrical Stimulation/Attended  [x] PROM/Stretching                    [x] Fluidotherapy          [x]  Paraffin                   [x] AAROM  [x] AROM

## 2024-01-10 ENCOUNTER — HOSPITAL ENCOUNTER (OUTPATIENT)
Dept: OCCUPATIONAL THERAPY | Age: 44
Setting detail: THERAPIES SERIES
Discharge: HOME | End: 2024-01-10
Payer: COMMERCIAL

## 2024-01-10 PROCEDURE — 97140 MANUAL THERAPY 1/> REGIONS: CPT

## 2024-01-10 PROCEDURE — 97018 PARAFFIN BATH THERAPY: CPT

## 2024-01-10 PROCEDURE — 97110 THERAPEUTIC EXERCISES: CPT

## 2024-01-10 NOTE — PROGRESS NOTES
OCCUPATIONAL THERAPY DAILY TREATMENT NOTE  Y  [facility]  [facility]  [address] [facility] [address]  Dept: [phone]  Loc: [phone]   SEYZ MAIN OT fax [phone]      Date:  1/10/2024  Initial Evaluation Date: 23   Evaluating Therapist: TACHO Israel    Patient Name:  Sharita Sharma    :  1980    strictions/Precautions:  Follow conservative metacarpal neck fx protocol, low fall risk  Diagnosis:  S62.337A Displaced fracture of neck of fifth metacarpal bone, left hand, initial encounter for closed    fractured the neck of the L fifth metacarpal                                                      Date of Surgery/Injury: 10/19/23 inj (11 weeks post)     Insurance/Certification information:  [facility] (no auth)  Plan of care signed (Y/N): N  Visit# / total visits: -      Referring Practitioner:  Olivier Enriquez  Specific Practitioner Orders: OT Eval & Treat     Assessment of current deficits   [] Functional mobility             [x] ADLs          [x] Strength                 [] Cognition   [] Functional transfers           [x] IADLs         [] Safety Awareness  [x] Endurance   [x] Fine Motor Coordination    [] Balance      [] Vision/perception   [x] Sensation     [x] Gross Motor Coordination [x] ROM          [x] Pain                        [x] Edema          [] Scar Adhesion/Skin Integrity      OT PLAN OF CARE   OT POC based on physician orders, patient diagnosis and results of clinical assessment     Frequency/Duration: 1- 2 / week for 12- 18 visits.   Certification period From: 23  To: 24     Specific OT Treatment to include:   [x] Instruction in HEP                   Modalities:  [x] Therapeutic Exercise                 [x] Ultrasound               [] Electrical Stimulation/Attended  [x] PROM/Stretching                    [x] Fluidotherapy          [x]  Paraffin                   [x] AAROM  [x] AROM

## 2024-01-11 ENCOUNTER — HOSPITAL ENCOUNTER (EMERGENCY)
Age: 44
Discharge: HOME | End: 2024-01-11
Payer: COMMERCIAL

## 2024-01-11 ENCOUNTER — APPOINTMENT (OUTPATIENT)
Dept: GENERAL RADIOLOGY | Age: 44
End: 2024-01-11
Payer: COMMERCIAL

## 2024-01-11 VITALS
SYSTOLIC BLOOD PRESSURE: 160 MMHG | DIASTOLIC BLOOD PRESSURE: 103 MMHG | BODY MASS INDEX: 37.77 KG/M2 | TEMPERATURE: 97.8 F | HEIGHT: 73 IN | RESPIRATION RATE: 16 BRPM | WEIGHT: 285 LBS | OXYGEN SATURATION: 97 % | HEART RATE: 85 BPM

## 2024-01-11 DIAGNOSIS — S62.307G: ICD-10-CM

## 2024-01-11 DIAGNOSIS — S60.222A CONTUSION OF LEFT HAND, INITIAL ENCOUNTER: Primary | ICD-10-CM

## 2024-01-11 PROCEDURE — 6370000000 HC RX 637 (ALT 250 FOR IP): Performed by: NURSE PRACTITIONER

## 2024-01-11 PROCEDURE — 73110 X-RAY EXAM OF WRIST: CPT

## 2024-01-11 PROCEDURE — 29125 APPL SHORT ARM SPLINT STATIC: CPT

## 2024-01-11 PROCEDURE — 73130 X-RAY EXAM OF HAND: CPT

## 2024-01-11 PROCEDURE — 99283 EMERGENCY DEPT VISIT LOW MDM: CPT

## 2024-01-11 RX ORDER — ACETAMINOPHEN 500 MG
1000 TABLET ORAL ONCE
Status: DISCONTINUED | OUTPATIENT
Start: 2024-01-11 | End: 2024-01-11 | Stop reason: HOSPADM

## 2024-01-11 RX ORDER — IBUPROFEN 600 MG/1
600 TABLET ORAL ONCE
Status: COMPLETED | OUTPATIENT
Start: 2024-01-11 | End: 2024-01-11

## 2024-01-11 RX ADMIN — IBUPROFEN 600 MG: 600 TABLET, FILM COATED ORAL at 08:39

## 2024-01-11 ASSESSMENT — PAIN DESCRIPTION - LOCATION
LOCATION: HAND
LOCATION: HAND

## 2024-01-11 ASSESSMENT — PAIN DESCRIPTION - ORIENTATION: ORIENTATION: LEFT

## 2024-01-11 ASSESSMENT — LIFESTYLE VARIABLES
HOW MANY STANDARD DRINKS CONTAINING ALCOHOL DO YOU HAVE ON A TYPICAL DAY: PATIENT DOES NOT DRINK
HOW OFTEN DO YOU HAVE A DRINK CONTAINING ALCOHOL: NEVER

## 2024-01-11 ASSESSMENT — PAIN SCALES - GENERAL
PAINLEVEL_OUTOF10: 10
PAINLEVEL_OUTOF10: 10

## 2024-01-11 NOTE — ED NOTES
Pt declined splint. Pt called orthopedic doctor and he is able to see him this am. Ortho at [facility]. Ricky DELVALLE notified.

## 2024-01-11 NOTE — DISCHARGE INSTRUCTIONS
REST, ICE, ELEVATE, WHEN AT HOME.  ALTERNATE TYLENOL AND IBUPROFEN FOR PAIN.  ACE WRAP AS NEEDED FOR COMFORT  CONTACT ORTHO IN A WEEK IF PAIN/SWELLING CONTINUES.  RETURN FOR WORSENING SYMPTOMS.

## 2024-01-11 NOTE — ED PROVIDER NOTES
Independent NUSRAT Visit.        [facility]  Department of Emergency Medicine   ED  Encounter Note  Admit Date/RoomTime: 2024  8:24 AM  ED Room:     NAME: Shariat Sharma  : 1980  MRN: [id number]     Chief Complaint:  Hand Injury (Left Hand stuck in between washer and wall last night, swelling noted )    History of Present Illness       Sharita Sharma is a 43 y.o. old male presenting to the emergency department by private vehicle, for traumatic Left hand pain which occured 1 day(s) prior to arrival.  The complaint is due to crushing his hand between clothes washing machine and a wall when he was moving the machine.  States around 3 months ago, he had a prior left hand injury. Denies having surgery.  He has no other injuries.   ROS   Pertinent positives and negatives are stated within HPI, all other systems reviewed and are negative.    Past Medical History:  has no past medical history on file.    Surgical History:  has no past surgical history on file.    Social History:  reports that he has been smoking cigarettes. He has a 5.0 pack-year smoking history. He has never used smokeless tobacco. He reports current alcohol use. He reports current drug use. Frequency: 7.00 times per week. Drug: Marijuana (Weed).    Family History: family history is not on file.     Allergies: Lactose intolerance (gi)    Physical Exam   Oxygen Saturation Interpretation: Normal.        ED Triage Vitals   BP Temp Temp Source Pulse Respirations SpO2 Height Weight - Scale   24 0823 24 0823 24 0823 24 0823 24 0823 24 0823 24 0822 24 0822   (!) 165/110 97.8 °F (36.6 °C) Oral 96 18 98 % 1.854 m (6' 1\") 129.3 kg (285 lb)         Constitutional:  Alert, development consistent with age. Non-toxic appearing.   Neck:  Normal ROM.  Supple. Non-tender.  Physical Exam  Hand: Left dorsal              Tenderness: moderate.              Swelling: Moderate.

## 2024-01-13 ENCOUNTER — HOSPITAL ENCOUNTER (EMERGENCY)
Age: 44
Discharge: HOME | End: 2024-01-13
Attending: EMERGENCY MEDICINE
Payer: COMMERCIAL

## 2024-01-13 ENCOUNTER — APPOINTMENT (OUTPATIENT)
Dept: GENERAL RADIOLOGY | Age: 44
End: 2024-01-13
Payer: COMMERCIAL

## 2024-01-13 VITALS
SYSTOLIC BLOOD PRESSURE: 162 MMHG | TEMPERATURE: 98.2 F | RESPIRATION RATE: 16 BRPM | HEART RATE: 86 BPM | OXYGEN SATURATION: 98 % | DIASTOLIC BLOOD PRESSURE: 90 MMHG

## 2024-01-13 DIAGNOSIS — Z87.81 HISTORY OF HAND FRACTURE: ICD-10-CM

## 2024-01-13 DIAGNOSIS — M79.642 LEFT HAND PAIN: Primary | ICD-10-CM

## 2024-01-13 PROCEDURE — 73110 X-RAY EXAM OF WRIST: CPT

## 2024-01-13 PROCEDURE — 73130 X-RAY EXAM OF HAND: CPT

## 2024-01-13 PROCEDURE — 29125 APPL SHORT ARM SPLINT STATIC: CPT

## 2024-01-13 PROCEDURE — 99283 EMERGENCY DEPT VISIT LOW MDM: CPT

## 2024-01-13 PROCEDURE — 6370000000 HC RX 637 (ALT 250 FOR IP): Performed by: EMERGENCY MEDICINE

## 2024-01-13 RX ORDER — IBUPROFEN 800 MG/1
800 TABLET ORAL ONCE
Status: DISCONTINUED | OUTPATIENT
Start: 2024-01-13 | End: 2024-01-13 | Stop reason: HOSPADM

## 2024-01-13 RX ORDER — PREDNISONE 50 MG/1
50 TABLET ORAL DAILY
Qty: 5 TABLET | Refills: 0 | Status: SHIPPED | OUTPATIENT
Start: 2024-01-13 | End: 2024-01-18

## 2024-01-13 RX ORDER — DICLOFENAC POTASSIUM 50 MG/1
50 TABLET, FILM COATED ORAL 3 TIMES DAILY PRN
Qty: 15 TABLET | Refills: 0 | Status: SHIPPED | OUTPATIENT
Start: 2024-01-13

## 2024-01-13 RX ORDER — PREDNISONE 20 MG/1
60 TABLET ORAL ONCE
Status: COMPLETED | OUTPATIENT
Start: 2024-01-13 | End: 2024-01-13

## 2024-01-13 RX ADMIN — PREDNISONE 60 MG: 20 TABLET ORAL at 12:17

## 2024-01-13 ASSESSMENT — PAIN - FUNCTIONAL ASSESSMENT: PAIN_FUNCTIONAL_ASSESSMENT: 0-10

## 2024-01-13 ASSESSMENT — PAIN DESCRIPTION - DESCRIPTORS
DESCRIPTORS: ACHING;DISCOMFORT;THROBBING
DESCRIPTORS: DISCOMFORT;STABBING

## 2024-01-13 ASSESSMENT — PAIN DESCRIPTION - FREQUENCY: FREQUENCY: CONTINUOUS

## 2024-01-13 ASSESSMENT — PAIN SCALES - GENERAL
PAINLEVEL_OUTOF10: 10
PAINLEVEL_OUTOF10: 7

## 2024-01-13 ASSESSMENT — PAIN DESCRIPTION - PAIN TYPE: TYPE: ACUTE PAIN

## 2024-01-13 ASSESSMENT — PAIN DESCRIPTION - ORIENTATION
ORIENTATION: LEFT
ORIENTATION: LEFT

## 2024-01-13 ASSESSMENT — PAIN DESCRIPTION - LOCATION
LOCATION: HAND
LOCATION: HAND

## 2024-01-13 ASSESSMENT — PAIN DESCRIPTION - ONSET: ONSET: ON-GOING

## 2024-01-13 NOTE — ED PROVIDER NOTES
LEFT (MIN 3 VIEWS)    Result Date: 1/13/2024  EXAMINATION: THREE XRAY VIEWS OF THE LEFT HAND; THREE XRAY VIEWS OF THE LEFT WRIST 1/13/2024 12:38 pm COMPARISON: Left wrist and left hand 01/11/2024. HISTORY: ORDERING SYSTEM PROVIDED HISTORY: pain TECHNOLOGIST PROVIDED HISTORY: Reason for exam:->pain FINDINGS: LEFT WRIST AND LEFT HAND: There is redemonstration of a comminuted, displaced and angulated fracture of the distal 5th metacarpal.  Bony alignment does not appear significantly changed.  No other fracture is identified.  No dislocation is seen.     Relatively stable 5th metacarpal fracture.     XR WRIST LEFT (MIN 3 VIEWS)    Result Date: 1/11/2024  EXAMINATION: THREE XRAY VIEWS OF THE LEFT HAND; 3 XRAY VIEWS OF THE LEFT WRIST 1/11/2024 10:06 am COMPARISON: X-ray dated 10/20/2023 HISTORY: ORDERING SYSTEM PROVIDED HISTORY: TRAUMA TECHNOLOGIST PROVIDED HISTORY: Reason for exam:->TRAUMA FINDINGS: AP lateral and lateral oblique views of the left hand and left wrist reveal minimal healing of 5th metacarpal fracture from 10/20/2023 without significant increased malalignment or acute osseous findings otherwise noted. Carpal rows preserved.     Minimal healing of 5th metacarpal fracture from 10/20/2023.  No acute osseous findings have developed otherwise in the interim.  No increased distraction or malalignment of 5th metacarpal findings     XR HAND LEFT (MIN 3 VIEWS)    Result Date: 1/11/2024  EXAMINATION: THREE XRAY VIEWS OF THE LEFT HAND; 3 XRAY VIEWS OF THE LEFT WRIST 1/11/2024 10:06 am COMPARISON: X-ray dated 10/20/2023 HISTORY: ORDERING SYSTEM PROVIDED HISTORY: TRAUMA TECHNOLOGIST PROVIDED HISTORY: Reason for exam:->TRAUMA FINDINGS: AP lateral and lateral oblique views of the left hand and left wrist reveal minimal healing of 5th metacarpal fracture from 10/20/2023 without significant increased malalignment or acute osseous findings otherwise noted. Carpal rows preserved.     Minimal healing of 5th metacarpal  fracture from 10/20/2023.  No acute osseous findings have developed otherwise in the interim.  No increased distraction or malalignment of 5th metacarpal findings       No results found.    PROCEDURES   Unless otherwise noted below, none     Procedures    CRITICAL CARE TIME   none    PAST MEDICAL HISTORY      has a past medical history of Hand fracture, left.     EMERGENCY DEPARTMENT COURSE and DIFFERENTIAL DIAGNOSIS/MDM:   Vitals:    Vitals:    01/13/24 1151   BP: (!) 162/90   Pulse: 86   Resp: 16   Temp: 98.2 °F (36.8 °C)   TempSrc: Oral   SpO2: 98%       Patient was given the following medications:  Medications   ibuprofen (ADVIL;MOTRIN) tablet 800 mg (800 mg Oral Not Given 1/13/24 1217)   predniSONE (DELTASONE) tablet 60 mg (60 mg Oral Given 1/13/24 1217)             [unfilled]    Chronic Conditions:   Active Ambulatory Problems     Diagnosis Date Noted    Abnormal levels of other serum enzymes 06/06/2018    Chronic pain of both shoulders 02/11/2020    Tinea pedis 04/03/2019     Resolved Ambulatory Problems     Diagnosis Date Noted    No Resolved Ambulatory Problems     Past Medical History:   Diagnosis Date    Hand fracture, left          CONSULTS: (Who and What was discussed)  None    Discussion with Other Profesionals : None    Social Determinants : None    Records Reviewed : None    CC/HPI Summary, DDx, ED Course, and Reassessment: Patient with history of fracture of left hand.  Patient presented with worsening pain.  Differential diagnosis includes inflammatory arthritis, nonhealing chronic fracture name a few.  Patient had x-rays that showed minimal healing.  Patient admitted to frequently taken off his splint after his initial injury.  He was seen 2 days ago and allegedly he was going directly to his orthopedic office but did not see the doctor that day for some reason.  Patient is not in the splint.  Will recommend ulnar gutter splint at this time rest ice anti-inflammatory pain medicine.  Patient

## 2024-01-16 ENCOUNTER — HOSPITAL ENCOUNTER (OUTPATIENT)
Dept: OCCUPATIONAL THERAPY | Age: 44
Setting detail: THERAPIES SERIES
Discharge: HOME | End: 2024-01-16
Payer: COMMERCIAL

## 2024-01-16 PROCEDURE — 97018 PARAFFIN BATH THERAPY: CPT | Performed by: OCCUPATIONAL THERAPIST

## 2024-01-16 PROCEDURE — 97140 MANUAL THERAPY 1/> REGIONS: CPT | Performed by: OCCUPATIONAL THERAPIST

## 2024-01-16 PROCEDURE — 97110 THERAPEUTIC EXERCISES: CPT | Performed by: OCCUPATIONAL THERAPIST

## 2024-01-16 NOTE — PROGRESS NOTES
OCCUPATIONAL THERAPY DAILY TREATMENT NOTE  Y  [facility]  [facility]  [address] [facility] [address]  Dept: [phone]  Loc: [phone]   SEY[facility] MAIN OT fax [phone]      Date:  2024  Initial Evaluation Date: 23   Evaluating Therapist: TACHO Israel    Patient Name:  Sharita Sharma    :  1980    strictions/Precautions:  Follow conservative metacarpal neck fx protocol, low fall risk  Diagnosis:  S62.337A Displaced fracture of neck of fifth metacarpal bone, left hand, initial encounter for closed    fractured the neck of the L fifth metacarpal                                                      Date of Surgery/Injury: 10/19/23 inj (11 weeks post)     Insurance/Certification information:  [facility] (no auth)  Plan of care signed (Y/N): N  Visit# / total visits: - 18     Referring Practitioner:  Olivier Enriquez  Specific Practitioner Orders: OT Eval & Treat     Assessment of current deficits   [] Functional mobility             [x] ADLs          [x] Strength                 [] Cognition   [] Functional transfers           [x] IADLs         [] Safety Awareness  [x] Endurance   [x] Fine Motor Coordination    [] Balance      [] Vision/perception   [x] Sensation     [x] Gross Motor Coordination [x] ROM          [x] Pain                        [x] Edema          [] Scar Adhesion/Skin Integrity      OT PLAN OF CARE   OT POC based on physician orders, patient diagnosis and results of clinical assessment     Frequency/Duration: 1- 2 / week for 12- 18 visits.   Certification period From: 23  To: 24     Specific OT Treatment to include:   [x] Instruction in HEP                   Modalities:  [x] Therapeutic Exercise                 [x] Ultrasound               [] Electrical Stimulation/Attended  [x] PROM/Stretching                    [x] Fluidotherapy          [x]  Paraffin                   [x] AAROM  [x] AROM          Alert and oriented to person, place, time/situation. normal mood and affect. no apparent risk to self or others.

## 2024-01-18 ENCOUNTER — HOSPITAL ENCOUNTER (OUTPATIENT)
Dept: OCCUPATIONAL THERAPY | Age: 44
Setting detail: THERAPIES SERIES
Discharge: HOME | End: 2024-01-18
Payer: COMMERCIAL

## 2024-01-18 PROCEDURE — 97018 PARAFFIN BATH THERAPY: CPT | Performed by: OCCUPATIONAL THERAPIST

## 2024-01-18 PROCEDURE — 97110 THERAPEUTIC EXERCISES: CPT | Performed by: OCCUPATIONAL THERAPIST

## 2024-01-18 PROCEDURE — 97140 MANUAL THERAPY 1/> REGIONS: CPT | Performed by: OCCUPATIONAL THERAPIST

## 2024-01-18 NOTE — PROGRESS NOTES
OCCUPATIONAL THERAPY DAILY TREATMENT NOTE  Y  [facility]  [facility]  [address] [facility] [address]  Dept: [phone]  Loc: [phone]   SEY[facility] MAIN OT fax [phone]      Date:  2024  Initial Evaluation Date: 23   Evaluating Therapist: TACHO Israel    Patient Name:  Sharita Sharma    :  1980    strictions/Precautions:  Follow conservative metacarpal neck fx protocol, low fall risk  Diagnosis:  S62.337A Displaced fracture of neck of fifth metacarpal bone, left hand, initial encounter for closed    fractured the neck of the L fifth metacarpal                                                      Date of Surgery/Injury: 10/19/23 inj (11 weeks post)     Insurance/Certification information:  [facility] (no auth)  Plan of care signed (Y/N): N  Visit# / total visits: -      Referring Practitioner:  Olivier Enriquez  Specific Practitioner Orders: OT Eval & Treat     Assessment of current deficits   [] Functional mobility             [x] ADLs          [x] Strength                 [] Cognition   [] Functional transfers           [x] IADLs         [] Safety Awareness  [x] Endurance   [x] Fine Motor Coordination    [] Balance      [] Vision/perception   [x] Sensation     [x] Gross Motor Coordination [x] ROM          [x] Pain                        [x] Edema          [] Scar Adhesion/Skin Integrity      OT PLAN OF CARE   OT POC based on physician orders, patient diagnosis and results of clinical assessment     Frequency/Duration: 1- 2 / week for 12- 18 visits.   Certification period From: 23  To: 24     Specific OT Treatment to include:   [x] Instruction in HEP                   Modalities:  [x] Therapeutic Exercise                 [x] Ultrasound               [] Electrical Stimulation/Attended  [x] PROM/Stretching                    [x] Fluidotherapy          [x]  Paraffin                   [x] AAROM  [x] AROM

## 2024-01-22 ENCOUNTER — HOSPITAL ENCOUNTER (OUTPATIENT)
Dept: OCCUPATIONAL THERAPY | Age: 44
Setting detail: THERAPIES SERIES
Discharge: HOME | End: 2024-01-22
Payer: COMMERCIAL

## 2024-01-22 PROCEDURE — 97018 PARAFFIN BATH THERAPY: CPT

## 2024-01-22 PROCEDURE — 97140 MANUAL THERAPY 1/> REGIONS: CPT

## 2024-01-22 PROCEDURE — 97110 THERAPEUTIC EXERCISES: CPT

## 2024-01-22 NOTE — PROGRESS NOTES
OCCUPATIONAL THERAPY DAILY TREATMENT NOTE  Y  [facility]  [facility]  [address] [facility] [address]  Dept: [phone]  Loc: [phone]   SEY[facility] MAIN OT fax [phone]      Date:  2024  Initial Evaluation Date: 23   Evaluating Therapist: TACHO Israel    Patient Name:  Sharita Sharma    :  1980    strictions/Precautions:  Follow conservative metacarpal neck fx protocol, no heavy lifting, low fall risk  Diagnosis:  S62.337A Displaced fracture of neck of fifth metacarpal bone, left hand, initial encounter for closed    fractured the neck of the L fifth metacarpal                                                      Date of Surgery/Injury: 10/19/23 inj (11 weeks post)     Insurance/Certification information:  [facility] (no auth)  Plan of care signed (Y/N): N  Visit# / total visits: -      Referring Practitioner:  Olivier Enriquez  Specific Practitioner Orders: OT Eval & Treat     Assessment of current deficits   [] Functional mobility             [x] ADLs          [x] Strength                 [] Cognition   [] Functional transfers           [x] IADLs         [] Safety Awareness  [x] Endurance   [x] Fine Motor Coordination    [] Balance      [] Vision/perception   [x] Sensation     [x] Gross Motor Coordination [x] ROM          [x] Pain                        [x] Edema          [] Scar Adhesion/Skin Integrity      OT PLAN OF CARE   OT POC based on physician orders, patient diagnosis and results of clinical assessment     Frequency/Duration: 1- 2 / week for 12- 18 visits.   Certification period From: 23  To: 24     Specific OT Treatment to include:   [x] Instruction in HEP                   Modalities:  [x] Therapeutic Exercise                 [x] Ultrasound               [] Electrical Stimulation/Attended  [x] PROM/Stretching                    [x] Fluidotherapy          [x]  Paraffin                   [x] AAROM

## 2024-01-23 ENCOUNTER — TELEPHONE (OUTPATIENT)
Dept: FAMILY MEDICINE CLINIC | Age: 44
End: 2024-01-23

## 2024-01-23 NOTE — TELEPHONE ENCOUNTER
Pt called in stating he is having left hand pain. Pt stated he broke his left hand 2 months ago and nothing is getting better. Pt stated he sees ortho at [facility] and has been doing OT. He stated they said his \"hand is not healing and they suck\" and he wants to see you in regards of what he should do to help his hand heal. I advised pt he will need to speak with ortho since they are the specialist  but he stated he does not like them. Pt stated he is having hand swelling, increased pain, and has been taking a lot of tylenol and he does not want to \"OD\" on tylenol. Pt is requesting an appt to discuss options. No appt available. Pt stated xray of hand is available to view in his chart. Please advise

## 2024-01-24 ENCOUNTER — HOSPITAL ENCOUNTER (OUTPATIENT)
Dept: OCCUPATIONAL THERAPY | Age: 44
Setting detail: THERAPIES SERIES
Discharge: HOME | End: 2024-01-24
Payer: COMMERCIAL

## 2024-01-24 NOTE — PROGRESS NOTES
Phone: [phone] Fax: [phone]    Occupational Therapy   Cancellation/No-show Note     Patient Name:  Sharita Sharma  : 1980  Date:  2024  MRN: [id number]    For today's appointment patient:       Total missed visits including today: 3         Total number of no shows: 1    [x]  Cancelled   []  Rescheduled appointment   []  No-show     Reason given by patient:   []  Patient ill   []  Conflicting appointment   []  No transportation   []  Conflict with work   [x]  No reason given   []  Other:     Comments: Pt cancelled scheduled OT treatment    Electronically signed by: LEDY Israel, OTR/L [id number]

## 2024-01-25 ENCOUNTER — OFFICE VISIT (OUTPATIENT)
Dept: FAMILY MEDICINE CLINIC | Age: 44
End: 2024-01-25

## 2024-01-25 VITALS
OXYGEN SATURATION: 95 % | TEMPERATURE: 97.1 F | DIASTOLIC BLOOD PRESSURE: 90 MMHG | RESPIRATION RATE: 18 BRPM | HEART RATE: 93 BPM | BODY MASS INDEX: 36.7 KG/M2 | WEIGHT: 278.2 LBS | SYSTOLIC BLOOD PRESSURE: 134 MMHG

## 2024-01-25 DIAGNOSIS — J10.1 INFLUENZA A: Primary | ICD-10-CM

## 2024-01-25 DIAGNOSIS — R52 BODY ACHES: ICD-10-CM

## 2024-01-25 DIAGNOSIS — S62.307S CLOSED NONDISPLACED FRACTURE OF FIFTH METACARPAL BONE OF LEFT HAND, UNSPECIFIED PORTION OF METACARPAL, SEQUELA: ICD-10-CM

## 2024-01-25 DIAGNOSIS — R05.1 ACUTE COUGH: ICD-10-CM

## 2024-01-25 DIAGNOSIS — R68.89 FLU-LIKE SYMPTOMS: ICD-10-CM

## 2024-01-25 LAB
INFLUENZA A ANTIBODY: POSITIVE
INFLUENZA B ANTIBODY: NORMAL
Lab: NORMAL
PERFORMING INSTRUMENT: NORMAL
QC PASS/FAIL: NORMAL
SARS-COV-2, POC: NORMAL

## 2024-01-25 RX ORDER — TRIAMCINOLONE ACETONIDE 40 MG/ML
40 INJECTION, SUSPENSION INTRA-ARTICULAR; INTRAMUSCULAR ONCE
Status: COMPLETED | OUTPATIENT
Start: 2024-01-25 | End: 2024-01-25

## 2024-01-25 RX ORDER — OSELTAMIVIR PHOSPHATE 75 MG/1
75 CAPSULE ORAL 2 TIMES DAILY
Qty: 10 CAPSULE | Refills: 0 | Status: SHIPPED | OUTPATIENT
Start: 2024-01-25 | End: 2024-01-30

## 2024-01-25 RX ADMIN — TRIAMCINOLONE ACETONIDE 40 MG: 40 INJECTION, SUSPENSION INTRA-ARTICULAR; INTRAMUSCULAR at 11:09

## 2024-01-25 SDOH — ECONOMIC STABILITY: FOOD INSECURITY: WITHIN THE PAST 12 MONTHS, THE FOOD YOU BOUGHT JUST DIDN'T LAST AND YOU DIDN'T HAVE MONEY TO GET MORE.: NEVER TRUE

## 2024-01-25 SDOH — ECONOMIC STABILITY: FOOD INSECURITY: WITHIN THE PAST 12 MONTHS, YOU WORRIED THAT YOUR FOOD WOULD RUN OUT BEFORE YOU GOT MONEY TO BUY MORE.: NEVER TRUE

## 2024-01-25 SDOH — ECONOMIC STABILITY: INCOME INSECURITY: HOW HARD IS IT FOR YOU TO PAY FOR THE VERY BASICS LIKE FOOD, HOUSING, MEDICAL CARE, AND HEATING?: NOT HARD AT ALL

## 2024-01-25 ASSESSMENT — PATIENT HEALTH QUESTIONNAIRE - PHQ9
SUM OF ALL RESPONSES TO PHQ QUESTIONS 1-9: 0
SUM OF ALL RESPONSES TO PHQ9 QUESTIONS 1 & 2: 0
1. LITTLE INTEREST OR PLEASURE IN DOING THINGS: 0
SUM OF ALL RESPONSES TO PHQ QUESTIONS 1-9: 0
SUM OF ALL RESPONSES TO PHQ QUESTIONS 1-9: 0
2. FEELING DOWN, DEPRESSED OR HOPELESS: 0

## 2024-01-25 NOTE — PROGRESS NOTES
normal.         Thought Content: Thought content normal.          Testing:   (All laboratory and radiology results have been personally reviewed by myself)  Labs:  Results for orders placed or performed in visit on 01/25/24   POCT COVID-19, Antigen   Result Value Ref Range    SARS-COV-2, POC Not-Detected Not Detected    Lot Number [id number]     QC Pass/Fail pass     Performing Instrument BD Veritor    POCT Influenza A/B   Result Value Ref Range    Influenza A Ab positive     Influenza B Ab negtive        Imaging:  All Radiology results interpreted by Radiologist unless otherwise noted.  XR CHEST STANDARD (2 VW)    (Results Pending)       Assessment / Plan:   The patient's vitals, allergies, medications, and past medical history have been reviewed.  Sharita was seen today for cough and fever.    Diagnoses and all orders for this visit:    Influenza A  -     oseltamivir (TAMIFLU) 75 MG capsule; Take 1 capsule by mouth 2 times daily for 5 days    Acute cough  -     POCT COVID-19, Antigen  -     XR CHEST STANDARD (2 VW); Future    Flu-like symptoms  -     POCT Influenza A/B    Body aches  -     triamcinolone acetonide (KENALOG-40) injection 40 mg    Closed nondisplaced fracture of fifth metacarpal bone of left hand, unspecified portion of metacarpal, sequela  -     Yosef Clarke MD, Orthopaedics (hand and upper extremities), [place] ([facility])        - Patient is directed to take the prescribed medication as ordered. Discussed taking vitamin D, vitamin C, and zinc supplementation.     - Advised to follow current CDC guidelines. Increase fluids and rest. Symptomatic relief discussed including Tylenol prn pain/fever. Red flag symptoms were discussed with the patient today.  The patient is directed to go the ED if symptoms worsen or change. Pt verbalizes understanding and is in agreement with plan of care. All questions answered.    SIGNATURE: Suzie Dumas DO

## 2024-01-29 ENCOUNTER — HOSPITAL ENCOUNTER (OUTPATIENT)
Dept: OCCUPATIONAL THERAPY | Age: 44
Setting detail: THERAPIES SERIES
Discharge: HOME | End: 2024-01-29
Payer: COMMERCIAL

## 2024-01-29 PROCEDURE — 97110 THERAPEUTIC EXERCISES: CPT

## 2024-01-29 PROCEDURE — 97018 PARAFFIN BATH THERAPY: CPT

## 2024-01-29 PROCEDURE — 97140 MANUAL THERAPY 1/> REGIONS: CPT

## 2024-01-29 NOTE — PROGRESS NOTES
OCCUPATIONAL THERAPY DAILY TREATMENT NOTE  Y  [facility]  [facility]CC[facility]ATIONAL [facility]ERAP[facility]  [address] [facility] [address]  Dept: [phone]  Loc: [phone]   SE[facility] MAIN OT fax [phone]      Date:  2024  Initial Evaluation Date: 23   Evaluating Therapist: TACHO Israel    Patient Name:  Sharita Sharma    :  1980    strictions/Precautions:  Follow conservative metacarpal neck fx protocol, no heavy lifting, low fall risk  Diagnosis:  S62.337A Displaced fracture of neck of fifth metacarpal bone, left hand, initial encounter for closed    fractured the neck of the L fifth metacarpal                                                      Date of Surgery/Injury: 10/19/23 inj (11 weeks post)     Insurance/Certification information:  [facility] (no auth)  Plan of care signed (Y/N): N  Visit# / total visits: 15/12-      Referring Practitioner:  Olivier Enriquez  Specific Practitioner Orders: OT Eval & Treat     Assessment of current deficits   [] Functional mobility             [x] ADLs          [x] Strength                 [] Cognition   [] Functional transfers           [x] IADLs         [] Safety Awareness  [x] Endurance   [x] Fine Motor Coordination    [] Balance      [] Vision/perception   [x] Sensation     [x] Gross Motor Coordination [x] ROM          [x] Pain                        [x] Edema          [] Scar Adhesion/Skin Integrity      OT PLAN OF CARE   OT POC based on physician orders, patient diagnosis and results of clinical assessment     Frequency/Duration: 1- 2 / week for 12- 18 visits.   Certification period From: 23  To: 24     Specific OT Treatment to include:   [x] Instruction in HEP                   Modalities:  [x] Therapeutic Exercise                 [x] Ultrasound               [] Electrical Stimulation/Attended  [x] PROM/Stretching                    [x] Fluidotherapy          [x]  Paraffin                   [x] AAROM   Continue Regimen: Triamcinolone as needed Detail Level: Zone Plan: 7/10/2023 going to send referral for patch testing

## 2024-01-31 ENCOUNTER — HOSPITAL ENCOUNTER (OUTPATIENT)
Dept: OCCUPATIONAL THERAPY | Age: 44
Setting detail: THERAPIES SERIES
Discharge: HOME | End: 2024-01-31
Payer: COMMERCIAL

## 2024-01-31 PROCEDURE — 97110 THERAPEUTIC EXERCISES: CPT

## 2024-01-31 PROCEDURE — 97035 APP MDLTY 1+ULTRASOUND EA 15: CPT

## 2024-01-31 PROCEDURE — 97140 MANUAL THERAPY 1/> REGIONS: CPT

## 2024-01-31 NOTE — PROGRESS NOTES
OCCUPATIONAL THERAPY DAILY TREATMENT NOTE  [facility]  [facility]  [address] [facility] [address]  Dept: [phone]  Loc: [phone]   SE[facility] MAIN OT fax [phone]      Date:  2024  Initial Evaluation Date: 23   Evaluating Therapist: TACHO Israel    Patient Name:  Sharita Sharma    :  1980    strictions/Precautions:  Follow conservative metacarpal neck fx protocol, no heavy lifting, low fall risk  Diagnosis:  S62.337A Displaced fracture of neck of fifth metacarpal bone, left hand, initial encounter for closed    fractured the neck of the L fifth metacarpal                                                      Date of Surgery/Injury: 10/19/23 inj (11 weeks post)     Insurance/Certification information:  [facility] (no auth)  Plan of care signed (Y/N): N  Visit# / total visits: -      Referring Practitioner:  Olivier Enriquez  Specific Practitioner Orders: OT Eval & Treat     Assessment of current deficits   [] Functional mobility             [x] ADLs          [x] Strength                 [] Cognition   [] Functional transfers           [x] IADLs         [] Safety Awareness  [x] Endurance   [x] Fine Motor Coordination    [] Balance      [] Vision/perception   [x] Sensation     [x] Gross Motor Coordination [x] ROM          [x] Pain                        [x] Edema          [] Scar Adhesion/Skin Integrity      OT PLAN OF CARE   OT POC based on physician orders, patient diagnosis and results of clinical assessment     Frequency/Duration: 1- 2 / week for 12- 18 visits.   Certification period From: 23  To: 24     Specific OT Treatment to include:   [x] Instruction in HEP                   Modalities:  [x] Therapeutic Exercise                 [x] Ultrasound               [] Electrical Stimulation/Attended  [x] PROM/Stretching                    [x] Fluidotherapy          [x]  Paraffin                   [x] AAROM

## 2024-02-05 ENCOUNTER — HOSPITAL ENCOUNTER (OUTPATIENT)
Dept: OCCUPATIONAL THERAPY | Age: 44
Setting detail: THERAPIES SERIES
Discharge: HOME | End: 2024-02-05
Payer: COMMERCIAL

## 2024-02-05 NOTE — PROGRESS NOTES
Phone: [phone] Fax: [phone]    Occupational Therapy   Cancellation/No-show Note     Patient Name:  Sharita Sharma  : 1980  Date:  2024  MRN: [id number]    For today's appointment patient:       Total missed visits including today: 4         Total number of no shows: 1    [x]  Cancelled   []  Rescheduled appointment   []  No-show     Reason given by patient:   []  Patient ill   []  Conflicting appointment   []  No transportation   []  Conflict with work   [x]  No reason given   []  Other:     Comments: Pt cancelled scheduled OT treatment    Electronically signed by: LEDY Israel, OTR/L [id number]

## 2024-02-07 ENCOUNTER — APPOINTMENT (OUTPATIENT)
Dept: OCCUPATIONAL THERAPY | Age: 44
End: 2024-02-07
Payer: COMMERCIAL

## 2024-02-07 ENCOUNTER — HOSPITAL ENCOUNTER (OUTPATIENT)
Dept: OCCUPATIONAL THERAPY | Age: 44
Setting detail: THERAPIES SERIES
Discharge: HOME | End: 2024-02-07
Payer: COMMERCIAL

## 2024-02-07 PROCEDURE — 97110 THERAPEUTIC EXERCISES: CPT

## 2024-02-07 PROCEDURE — 97140 MANUAL THERAPY 1/> REGIONS: CPT

## 2024-02-07 PROCEDURE — 97018 PARAFFIN BATH THERAPY: CPT

## 2024-02-07 NOTE — PROGRESS NOTES
OCCUPATIONAL THERAPY DAILY TREATMENT NOTE  [facility]  [facility]  [address] [facility] [address]  Dept: [phone]  Loc: [phone]   [facility] MAIN OT fax [phone]      Date:  2024  Initial Evaluation Date: 23   Evaluating Therapist: TACHO Israel    Patient Name:  Sharita Sharma    :  1980    strictions/Precautions:  Follow conservative metacarpal neck fx protocol, no heavy lifting, low fall risk  Diagnosis:  S62.337A Displaced fracture of neck of fifth metacarpal bone, left hand, initial encounter for closed    fractured the neck of the L fifth metacarpal                                                      Date of Surgery/Injury: 10/19/23 inj (11 weeks post)     Insurance/Certification information:  [facility] (no auth)  Plan of care signed (Y/N): N  Visit# / total visits: -      Referring Practitioner:  Olivier Enriquez  Specific Practitioner Orders: OT Eval & Treat     Assessment of current deficits   [] Functional mobility             [x] ADLs          [x] Strength                 [] Cognition   [] Functional transfers           [x] IADLs         [] Safety Awareness  [x] Endurance   [x] Fine Motor Coordination    [] Balance      [] Vision/perception   [x] Sensation     [x] Gross Motor Coordination [x] ROM          [x] Pain                        [x] Edema          [] Scar Adhesion/Skin Integrity      OT PLAN OF CARE   OT POC based on physician orders, patient diagnosis and results of clinical assessment     Frequency/Duration: 1- 2 / week for 12- 18 visits.   Certification period From: 23  To: 24     Specific OT Treatment to include:   [x] Instruction in HEP                   Modalities:  [x] Therapeutic Exercise                 [x] Ultrasound               [] Electrical Stimulation/Attended  [x] PROM/Stretching                    [x] Fluidotherapy          [x]  Paraffin                   [x] AAROM

## 2024-02-12 ENCOUNTER — HOSPITAL ENCOUNTER (OUTPATIENT)
Dept: OCCUPATIONAL THERAPY | Age: 44
Setting detail: THERAPIES SERIES
Discharge: HOME | End: 2024-02-12
Payer: COMMERCIAL

## 2024-02-12 PROCEDURE — 97530 THERAPEUTIC ACTIVITIES: CPT

## 2024-02-12 PROCEDURE — 97140 MANUAL THERAPY 1/> REGIONS: CPT

## 2024-02-12 PROCEDURE — 97018 PARAFFIN BATH THERAPY: CPT

## 2024-02-12 NOTE — PROGRESS NOTES
Assessment/Comments: Progress update and re certification completed at this date. Pt arrives late for session. Pt reports accidentally over stretching L hand and has increased pain today. Pt re-educated on weight bearing status--verbalizes understanding. Pt reports Dr. Peterson's office communicated to him that bone stimulator will be delivered to pt's house but it has not been delivered yet. Last XRAY revealed minimal healing of 5th metacarpal--Sessions during this reporting period have focused on pain reduction, AROM, healing, and education on safety/weight bearing status. Strength not assessed today due to weight bearing status. Pt is maintaining AROM in wrist and digits and reports compliance with HEP, including splint wear. Pt educated to wear splint at night and during \"heavier' activities. Pt reports difficulty with grasping items due d/t strength, tightness, and pain. Pt reduced frequency during this reporting period from being seen 2x/week to 1x/week due to limited engagement/participation in therapy secondary to delayed bone healing. Pain continues to be elevated, possibility due to decreased healing of bone. Pt educated on splint wear, modalities, massage, and gentle active range to assist with pain--intermittent compliance noted. Progress as appropriate.      L UE ROM  -  KEY: Ext/Flex     AROM         IE 01/09 2/12    Wrist Flexion: 0/60-80* 55*. Pain in ulnar wrist   70* 90*      Extension: 0/60-70* 32*  60*  65*     Radial Deviation: 0/20-25* 5*  15*  15*     Ulnar Deviation: 0/30-45* 10*  45* 40*         L Hand ROM           01/09 2/12    2 Digit MCP E/F: 0/90*-100* 0/63*  0*/70*  80*     PIP E/F: 0/100* 0/90*  0*/105 100*      DIP E/F: 0/70*-90* 0/68* 0*/ 70*  66*      3 Digit MCP E/F: 0/90*-100* 0/54* 0*/ 73*  78*     PIP E/F: 0/100* 0/89*  0*/100  95*     DIP E/F: 0/70*-90* 0/60* 0*/ 80*  72*        4 Digit MCP E/F: 0/90*-100* 0/49* 0*/70*   67*     PIP E/F: 0/100* 0/85* 0*/ 100  102*     DIP E/F:

## 2024-02-14 ENCOUNTER — APPOINTMENT (OUTPATIENT)
Dept: OCCUPATIONAL THERAPY | Age: 44
End: 2024-02-14
Payer: COMMERCIAL

## 2024-02-16 ENCOUNTER — TELEPHONE (OUTPATIENT)
Dept: FAMILY MEDICINE CLINIC | Age: 44
End: 2024-02-16

## 2024-02-16 NOTE — TELEPHONE ENCOUNTER
Pt called in this morning and his hand pain is so bad he cannot sleep   He said dr sierra did not help him and did not have a great bed side manner;  Should we do another referral or because it is so swollen should he go to ortho walk in?  What do you advise?

## 2024-02-19 ENCOUNTER — HOSPITAL ENCOUNTER (OUTPATIENT)
Dept: OCCUPATIONAL THERAPY | Age: 44
Setting detail: THERAPIES SERIES
Discharge: HOME | End: 2024-02-19
Payer: COMMERCIAL

## 2024-02-19 PROCEDURE — 97140 MANUAL THERAPY 1/> REGIONS: CPT

## 2024-02-19 PROCEDURE — 97110 THERAPEUTIC EXERCISES: CPT

## 2024-02-19 NOTE — PROGRESS NOTES
OCCUPATIONAL THERAPY DAILY NOTE  [facility]  [facility]  [address] [facility] [address]  Dept: [phone]  Loc: [phone]   [facility] MAIN OT fax [phone]      Date:  2024  Initial Evaluation Date: 23   Evaluating Therapist: TACHO Israel    Patient Name:  Sharita Sharma    :  1980    strictions/Precautions:  Follow conservative metacarpal neck fx protocol, no heavy lifting, low fall risk  Diagnosis:  S62.337A Displaced fracture of neck of fifth metacarpal bone, left hand, initial encounter for closed    fractured the neck of the L fifth metacarpal                                                      Date of Surgery/Injury: 10/19/23 inj      Insurance/Certification information:  [facility] (no auth)  Plan of care signed (Y/N): N  Visit# / total visits: 1/15- 18 already completed     Referring Practitioner:  Olivier Enriquez  Specific Practitioner Orders: OT Eval & Treat     Assessment of current deficits   [] Functional mobility             [x] ADLs          [x] Strength                 [] Cognition   [] Functional transfers           [x] IADLs         [] Safety Awareness  [x] Endurance   [x] Fine Motor Coordination    [] Balance      [] Vision/perception   [x] Sensation     [x] Gross Motor Coordination [x] ROM          [x] Pain                        [x] Edema          [] Scar Adhesion/Skin Integrity      OT PLAN OF CARE   OT POC based on physician orders, patient diagnosis and results of clinical assessment     Frequency/Duration: 1- 2 / week for 12- 18 visits.   Certification period From: 23  To: 24   UPDATED CERTIFICATION : 1- 2x / week for an additional  15- 20 visits.   Certification period From: 24  To: 24    Specific OT Treatment to include:   [x] Instruction in HEP                   Modalities:  [x] Therapeutic Exercise                 [x] Ultrasound               [] Electrical

## 2024-02-26 ENCOUNTER — HOSPITAL ENCOUNTER (OUTPATIENT)
Dept: OCCUPATIONAL THERAPY | Age: 44
Setting detail: THERAPIES SERIES
Discharge: HOME | End: 2024-02-26
Payer: COMMERCIAL

## 2024-02-26 PROCEDURE — 97110 THERAPEUTIC EXERCISES: CPT

## 2024-02-26 PROCEDURE — 97140 MANUAL THERAPY 1/> REGIONS: CPT

## 2024-02-26 PROCEDURE — 97018 PARAFFIN BATH THERAPY: CPT

## 2024-02-26 NOTE — PROGRESS NOTES
OCCUPATIONAL THERAPY DAILY NOTE  [facility]  [facility]  [address] [facility] [address]  Dept: [phone]  Loc: [phone]   [facility] MAIN OT fax [phone]      Date:  2024  Initial Evaluation Date: 23   Evaluating Therapist: TACHO Israel    Patient Name:  Sharita Sharma    :  1980    strictions/Precautions:  Follow conservative metacarpal neck fx protocol, no heavy lifting, low fall risk  Diagnosis:  S62.337A Displaced fracture of neck of fifth metacarpal bone, left hand, initial encounter for closed    fractured the neck of the L fifth metacarpal                                                      Date of Surgery/Injury: 10/19/23 inj      Insurance/Certification information:  [facility] (no auth)  Plan of care signed (Y/N): N  Visit# / total visits: 2/15- 18 already completed     Referring Practitioner:  Olivier Enriquez  Specific Practitioner Orders: OT Eval & Treat     Assessment of current deficits   [] Functional mobility             [x] ADLs          [x] Strength                 [] Cognition   [] Functional transfers           [x] IADLs         [] Safety Awareness  [x] Endurance   [x] Fine Motor Coordination    [] Balance      [] Vision/perception   [x] Sensation     [x] Gross Motor Coordination [x] ROM          [x] Pain                        [x] Edema          [] Scar Adhesion/Skin Integrity      OT PLAN OF CARE   OT POC based on physician orders, patient diagnosis and results of clinical assessment     Frequency/Duration: 1- 2 / week for 12- 18 visits.   Certification period From: 23  To: 24   UPDATED CERTIFICATION : 1- 2x / week for an additional  15- 20 visits.   Certification period From: 24  To: 24    Specific OT Treatment to include:   [x] Instruction in HEP                   Modalities:  [x] Therapeutic Exercise                 [x] Ultrasound               [] Electrical

## 2024-03-04 ENCOUNTER — HOSPITAL ENCOUNTER (OUTPATIENT)
Dept: OCCUPATIONAL THERAPY | Age: 44
Setting detail: THERAPIES SERIES
Discharge: HOME | End: 2024-03-04

## 2024-03-04 NOTE — PROGRESS NOTES
Phone: [phone] Fax: [phone]    Occupational Therapy   Cancellation/No-show Note     Patient Name:  Sharita Sharma  : 1980  Date:  3/4/2024  MRN: [id number]    For today's appointment patient:       Total missed visits including today: 5         Total number of no shows: 1    [x]  Cancelled   []  Rescheduled appointment   []  No-show     Reason given by patient:   []  Patient ill   []  Conflicting appointment   []  No transportation   []  Conflict with work   [x]  No reason given   []  Other:     Comments: Pt cancelled scheduled OT treatment    Electronically signed by: LEDY Israel, OTR/L [id number]

## 2024-03-06 ENCOUNTER — HOSPITAL ENCOUNTER (OUTPATIENT)
Dept: OCCUPATIONAL THERAPY | Age: 44
Setting detail: THERAPIES SERIES
Discharge: HOME | End: 2024-03-06

## 2024-03-06 NOTE — PROGRESS NOTES
Phone: [phone] Fax: [phone]    Occupational Therapy   Cancellation/No-show Note     Patient Name:  Sharita Sharma  : 1980  Date:  3/6/2024  MRN: [id number]    For today's appointment patient:       Total missed visits including today: 6       Total number of no shows: 1    [x]  Cancelled   []  Rescheduled appointment   []  No-show     Reason given by patient:   []  Patient ill   []  Conflicting appointment   []  No transportation   []  Conflict with work   [x]  No reason given   []  Other:     Comments: Pt cancelled scheduled OT treatment due to reported increased pain in L hand from using it at job training yesterday.     Electronically signed by: LEDY Israel, OTR/L [id number]

## 2024-03-20 ENCOUNTER — HOSPITAL ENCOUNTER (OUTPATIENT)
Dept: OCCUPATIONAL THERAPY | Age: 44
Setting detail: THERAPIES SERIES
Discharge: HOME | End: 2024-03-20
Payer: COMMERCIAL

## 2024-03-20 PROCEDURE — 97140 MANUAL THERAPY 1/> REGIONS: CPT

## 2024-03-20 PROCEDURE — 97110 THERAPEUTIC EXERCISES: CPT

## 2024-03-20 PROCEDURE — 97018 PARAFFIN BATH THERAPY: CPT

## 2024-03-20 NOTE — PROGRESS NOTES
bearing status  4) Patient to report decreased pain in their affected L upper extremity from 9/10 to 4/10 or less with resistive functional use. Progressing, Pt continues to report 6-8/10 pain in 5th metacarpal region and ulnar hand, especially with functional use  5) Increase in fine motor function as evidenced by decreased time to complete 9-hole peg test and/or MRMT test by at least 5  seconds with LUE in order to complete fasteners during dressing tasks. Progressing, Pt is continues to demonstrate improvement with fine motor coordination throughout sessions.   6) Patient to report 100% compliance with their splint wear, care, and precautions if needed.   Progressing, is inconsistent with splint wear  7) Patient to report a decrease in hypersensitivity from 80% to 20% or less in their L hand.   Progressing, Pt demos 25- 50% hypersensitivity in L hand that has improved greatly since start of care  8) Patient will be knowledgeable of edema control techniques as evident with decreases from moderate to mild/none. Progressing, Pt presents with a .2 cm decrease in edema in L wrist, but continues to demo moderate edema on the dorsal portion of the hand between the 4th and 5th digit.   9) Patient will report carrying 5# basket/bag with LUE without use of compensatory strategies  Progressing, /lifting/and carrying limited at this time due to weight bearing status  10) Patient will demonstrate improved functional activity tolerance from Poor to Fair for ADL/IADL completion.  Progressing, Pt presents with Fair- activity tolerance during active range of motion activities  11) Patient will decrease QuickDASH score to 25% or less for increased participation in daily functional activities. Progressing, will re-assess at discharge.     TODAY'S TREATMENT     Pain Level: 5/10 in head of MCP region, sharp    Subjective: Pt states he recently went to pain clinic and is scheduled to get bone scan and MRI of hand. States that he

## 2024-03-27 ENCOUNTER — HOSPITAL ENCOUNTER (OUTPATIENT)
Dept: OCCUPATIONAL THERAPY | Age: 44
Setting detail: THERAPIES SERIES
Discharge: HOME | End: 2024-03-27
Payer: COMMERCIAL

## 2024-04-03 ENCOUNTER — HOSPITAL ENCOUNTER (OUTPATIENT)
Dept: OCCUPATIONAL THERAPY | Age: 44
Setting detail: THERAPIES SERIES
Discharge: HOME | End: 2024-04-03
Payer: COMMERCIAL

## 2024-04-03 PROCEDURE — 97140 MANUAL THERAPY 1/> REGIONS: CPT

## 2024-04-03 PROCEDURE — 97110 THERAPEUTIC EXERCISES: CPT

## 2024-04-03 PROCEDURE — 97018 PARAFFIN BATH THERAPY: CPT

## 2024-04-03 NOTE — PROGRESS NOTES
begin weaning out of splint during the day during functional activities.   Therapeutic Activity  -Progress update, review of goals, analysis of performance, taking of measurements     Assessment/Comments: Pt states that numbness in tips of digits 1- 4 is new. Pillar stretch included in manual techniques today. Pt has appt with Dr. Peterson April 12. Session focused on soft tissue healing and pain reduction with light, active ROM exercises included. Slight edema increase observed since last session, continued with KT application. Continue with pain mgmt, edema mgmt, and light ROM activities until pt sees Dr. Peterson.                                  -Rehab Potential: Good   -Patient Response to Treatment: Tolerated session well    Patient. Education:  [x] Plans/Goals, Risks/Benefits discussed  [x] Home exercise program  Method of Education: [] Verbal  [x] Demo  [x] Written  Comprehension of Education:  [x] Verbalizes understanding.  [x] Demonstrates understanding.  [x] Needs Review.  [] Demonstrates/verbalizes understanding of HEP/Ed previously given.    Time In: 8:05 am           Time Out: 9:00 am            CODE  Minutes  Units   92826 Fluidotherapy     04906 Paraffin 10 1   80894 Ultrasound     01203 Electrical Stim - Attended     02202 Iontophoresis     20696 Therapeutic Ex 25 2   31049 Therapeutic Activity     79254 Neuromuscular Re-Ed     19554 Manual Therapy 20 1   73681 ADL/COMP Tech Train     32489 Orthotic Management/Training      Other                 Total  55 4       Plan: OT 1- 2 / week for 12- 18 visits.   Certification period From: 11/27/23  To: 2/27/24    [x]  Continues Plan of care with focus on pain, edema, P/AROM, hypersensitivity, and eventually strength: Treatment covered based on POC and graduated to patient's progress.   Pt education continues at each visit to obtain maximum benefits from skilled OT intervention.  []  Alter Plan of care: Continue OT for additional 1- 2x / week for an additional  15-

## 2024-04-10 ENCOUNTER — HOSPITAL ENCOUNTER (OUTPATIENT)
Dept: OCCUPATIONAL THERAPY | Age: 44
Setting detail: THERAPIES SERIES
Discharge: HOME | End: 2024-04-10
Payer: COMMERCIAL

## 2024-04-10 ENCOUNTER — OFFICE VISIT (OUTPATIENT)
Dept: FAMILY MEDICINE CLINIC | Age: 44
End: 2024-04-10
Payer: COMMERCIAL

## 2024-04-10 VITALS
BODY MASS INDEX: 37.67 KG/M2 | WEIGHT: 284.2 LBS | SYSTOLIC BLOOD PRESSURE: 134 MMHG | HEART RATE: 85 BPM | OXYGEN SATURATION: 96 % | DIASTOLIC BLOOD PRESSURE: 74 MMHG | RESPIRATION RATE: 18 BRPM | TEMPERATURE: 96.9 F | HEIGHT: 73 IN

## 2024-04-10 DIAGNOSIS — Z00.00 ENCOUNTER FOR WELL ADULT EXAM WITHOUT ABNORMAL FINDINGS: Primary | ICD-10-CM

## 2024-04-10 DIAGNOSIS — E78.00 ELEVATED LDL CHOLESTEROL LEVEL: ICD-10-CM

## 2024-04-10 DIAGNOSIS — Z87.891 PERSONAL HISTORY OF TOBACCO USE, PRESENTING HAZARDS TO HEALTH: ICD-10-CM

## 2024-04-10 DIAGNOSIS — R73.9 HYPERGLYCEMIA: ICD-10-CM

## 2024-04-10 DIAGNOSIS — E66.01 SEVERE OBESITY (BMI 35.0-39.9) WITH COMORBIDITY (HCC): ICD-10-CM

## 2024-04-10 LAB
ALBUMIN SERPL-MCNC: 4.4 G/DL (ref 3.5–5.2)
ALP BLD-CCNC: 103 U/L (ref 40–129)
ALT SERPL-CCNC: 47 U/L (ref 0–40)
ANION GAP SERPL CALCULATED.3IONS-SCNC: 14 MMOL/L (ref 7–16)
AST SERPL-CCNC: 32 U/L (ref 0–39)
BASOPHILS ABSOLUTE: 0.02 K/UL (ref 0–0.2)
BASOPHILS RELATIVE PERCENT: 0 % (ref 0–2)
BILIRUB SERPL-MCNC: 0.3 MG/DL (ref 0–1.2)
BUN BLDV-MCNC: 15 MG/DL (ref 6–20)
CALCIUM SERPL-MCNC: 9.4 MG/DL (ref 8.6–10.2)
CHLORIDE BLD-SCNC: 107 MMOL/L (ref 98–107)
CHOLESTEROL: 192 MG/DL
CO2: 21 MMOL/L (ref 22–29)
CREAT SERPL-MCNC: 1.3 MG/DL (ref 0.7–1.2)
EOSINOPHILS ABSOLUTE: 0.06 K/UL (ref 0.05–0.5)
EOSINOPHILS RELATIVE PERCENT: 1 % (ref 0–6)
GFR SERPL CREATININE-BSD FRML MDRD: 71 ML/MIN/1.73M2
GLUCOSE BLD-MCNC: 135 MG/DL (ref 74–99)
HCT VFR BLD CALC: 43.2 % (ref 37–54)
HDLC SERPL-MCNC: 55 MG/DL
HEMOGLOBIN: 13.9 G/DL (ref 12.5–16.5)
IMMATURE GRANULOCYTES %: 0 % (ref 0–5)
IMMATURE GRANULOCYTES ABSOLUTE: <0.03 K/UL (ref 0–0.58)
LDL CHOLESTEROL: 97 MG/DL
LYMPHOCYTES ABSOLUTE: 1.9 K/UL (ref 1.5–4)
LYMPHOCYTES RELATIVE PERCENT: 42 % (ref 20–42)
MCH RBC QN AUTO: 31.5 PG (ref 26–35)
MCHC RBC AUTO-ENTMCNC: 32.2 G/DL (ref 32–34.5)
MCV RBC AUTO: 98 FL (ref 80–99.9)
MONOCYTES ABSOLUTE: 0.36 K/UL (ref 0.1–0.95)
MONOCYTES RELATIVE PERCENT: 8 % (ref 2–12)
NEUTROPHILS ABSOLUTE: 2.16 K/UL (ref 1.8–7.3)
NEUTROPHILS RELATIVE PERCENT: 48 % (ref 43–80)
PDW BLD-RTO: 13.4 % (ref 11.5–15)
PLATELET # BLD: 202 K/UL (ref 130–450)
PMV BLD AUTO: 12.9 FL (ref 7–12)
POTASSIUM SERPL-SCNC: 4.1 MMOL/L (ref 3.5–5)
RBC # BLD: 4.41 M/UL (ref 3.8–5.8)
SODIUM BLD-SCNC: 142 MMOL/L (ref 132–146)
TOTAL PROTEIN: 7.4 G/DL (ref 6.4–8.3)
TRIGL SERPL-MCNC: 200 MG/DL
VLDLC SERPL CALC-MCNC: 40 MG/DL
WBC # BLD: 4.5 K/UL (ref 4.5–11.5)

## 2024-04-10 PROCEDURE — 97110 THERAPEUTIC EXERCISES: CPT

## 2024-04-10 PROCEDURE — 36415 COLL VENOUS BLD VENIPUNCTURE: CPT | Performed by: STUDENT IN AN ORGANIZED HEALTH CARE EDUCATION/TRAINING PROGRAM

## 2024-04-10 PROCEDURE — 99406 BEHAV CHNG SMOKING 3-10 MIN: CPT | Performed by: STUDENT IN AN ORGANIZED HEALTH CARE EDUCATION/TRAINING PROGRAM

## 2024-04-10 PROCEDURE — 99396 PREV VISIT EST AGE 40-64: CPT | Performed by: STUDENT IN AN ORGANIZED HEALTH CARE EDUCATION/TRAINING PROGRAM

## 2024-04-10 PROCEDURE — 97018 PARAFFIN BATH THERAPY: CPT

## 2024-04-10 PROCEDURE — 97140 MANUAL THERAPY 1/> REGIONS: CPT

## 2024-04-10 ASSESSMENT — ENCOUNTER SYMPTOMS
SHORTNESS OF BREATH: 0
CONSTIPATION: 0
VOMITING: 0
BACK PAIN: 0
RHINORRHEA: 0
NAUSEA: 0
DIARRHEA: 0
EYE PAIN: 0
ABDOMINAL PAIN: 0

## 2024-04-10 NOTE — PATIENT INSTRUCTIONS
you learn more?  Go to [email or web address] and enter P072 to learn more about \"Well Visit, Ages 18 to 65: Care Instructions.\"  Current as of: August 6, 2023               Content Version: 14.0  © 4634-6232 [employer].   Care instructions adapted under license by [employer]. If you have questions about a medical condition or this instruction, always ask your healthcare professional. [employer], [employer] disclaims any warranty or liability for your use of this information.

## 2024-04-11 LAB — HBA1C MFR BLD: 5.8 % (ref 4–5.6)

## 2024-04-15 ENCOUNTER — HOSPITAL ENCOUNTER (OUTPATIENT)
Dept: OCCUPATIONAL THERAPY | Age: 44
Setting detail: THERAPIES SERIES
Discharge: HOME | End: 2024-04-15
Payer: COMMERCIAL

## 2024-04-15 PROCEDURE — 97110 THERAPEUTIC EXERCISES: CPT

## 2024-04-15 PROCEDURE — 97140 MANUAL THERAPY 1/> REGIONS: CPT

## 2024-04-15 NOTE — PROGRESS NOTES
bearing status  4) Patient to report decreased pain in their affected L upper extremity from 9/10 to 4/10 or less with resistive functional use. Progressing, Pt continues to report 6-8/10 pain in 5th metacarpal region and ulnar hand, especially with functional use  5) Increase in fine motor function as evidenced by decreased time to complete 9-hole peg test and/or MRMT test by at least 5  seconds with LUE in order to complete fasteners during dressing tasks. Progressing, Pt is continues to demonstrate improvement with fine motor coordination throughout sessions.   6) Patient to report 100% compliance with their splint wear, care, and precautions if needed.   Progressing, is inconsistent with splint wear  7) Patient to report a decrease in hypersensitivity from 80% to 20% or less in their L hand.   Progressing, Pt demos 25- 50% hypersensitivity in L hand that has improved greatly since start of care  8) Patient will be knowledgeable of edema control techniques as evident with decreases from moderate to mild/none. Progressing, Pt presents with a .2 cm decrease in edema in L wrist, but continues to demo moderate edema on the dorsal portion of the hand between the 4th and 5th digit.   9) Patient will report carrying 5# basket/bag with LUE without use of compensatory strategies  Progressing, /lifting/and carrying limited at this time due to weight bearing status  10) Patient will demonstrate improved functional activity tolerance from Poor to Fair for ADL/IADL completion.  Progressing, Pt presents with Fair- activity tolerance during active range of motion activities  11) Patient will decrease QuickDASH score to 25% or less for increased participation in daily functional activities. Progressing, will re-assess at discharge.     TODAY'S TREATMENT     Pain Level: 5-6/10 pain in dorsal MCP region, sharp    Subjective: \"My finger tips are still numb.\"     Objective:    Updated POC to be completed on 1/8/24, 2/12/24. Next

## 2024-04-24 ENCOUNTER — HOSPITAL ENCOUNTER (OUTPATIENT)
Dept: OCCUPATIONAL THERAPY | Age: 44
Setting detail: THERAPIES SERIES
Discharge: HOME | End: 2024-04-24
Payer: COMMERCIAL

## 2024-04-24 PROCEDURE — 97018 PARAFFIN BATH THERAPY: CPT

## 2024-04-24 PROCEDURE — 97110 THERAPEUTIC EXERCISES: CPT

## 2024-04-24 PROCEDURE — 97140 MANUAL THERAPY 1/> REGIONS: CPT

## 2024-04-24 PROCEDURE — 97530 THERAPEUTIC ACTIVITIES: CPT

## 2024-04-24 NOTE — PROGRESS NOTES
1   87865 Neuromuscular Re-Ed     16122 Manual Therapy 15 1   56817 ADL/COMP Tech Train     36451 Orthotic Management/Training      Other                 Total  60 4       Plan: OT 1- 2 / week for 12- 18 visits.   Certification period From: 11/27/23  To: 2/27/24    [x]  Continues Plan of care with focus on pain, edema, P/AROM, hypersensitivity, and eventually strength: Treatment covered based on POC and graduated to patient's progress.   Pt education continues at each visit to obtain maximum benefits from skilled OT intervention.  []  Alter Plan of care: Continue OT for additional 1- 2x / week for an additional  15- 20 visits.   Certification period From: 2/12/24  To: 6/12/24  []  Discharge:    LEDY Israel, OTR/L [id number]

## 2024-05-01 ENCOUNTER — HOSPITAL ENCOUNTER (OUTPATIENT)
Dept: OCCUPATIONAL THERAPY | Age: 44
Setting detail: THERAPIES SERIES
Discharge: HOME | End: 2024-05-01
Payer: COMMERCIAL

## 2024-05-01 PROCEDURE — 97110 THERAPEUTIC EXERCISES: CPT

## 2024-05-01 PROCEDURE — 97140 MANUAL THERAPY 1/> REGIONS: CPT

## 2024-05-01 PROCEDURE — 97018 PARAFFIN BATH THERAPY: CPT

## 2024-05-01 NOTE — PROGRESS NOTES
completed by 20th visit.     INTERVENTION: COMPLETED: SPECIFICS/COMMENTS:   Modality:     Paraffin + MH X -To L hand and wrist + MH x 10 min to promote soft tissue extensibility   Ultrasound  To dorsal aspect of hand to increase circulation, reduce edema, and promote healing  3.3 mHZ  100% Duty cycle  0.8 intensity  8 min   Fluidotherapy  *Affected UE: Completes x 15 min to promote tissue healing, skin desensitization, reduce inflammation, and decrease pain. Actively completed SROM in all available planes with holds at end range during treatment to facilitate improved functional ROM under direct supervision.    AROM:     L hand     X   X             X                                 X  -Digit adduction/abduction x 20  -Composite flexion and extension SF & RF x 10  -towel scrunches with digits x 10 ^'d to 20  -Extension lifts SF & RF x 10 ea ^'d to 15  - in hand manipulation focusing on flexion of the 4th and 5th digit to hold large pegs and small pegs while translation from palm to index finger to place into peg board  Wrist ex/flex using 2# wt ball for extension of palm 10 reps  Pro/Sup with digits stretch on 2# ball 10 reps  -Hook, fist, hook and extension x 10 ^'d to 25  -Composite fist around yellow (very light) resistive sponge x 10  -Tendon glides x10 reps  -MCP, PIP and DIP with blocking 4th and fifth digits x15 reps  -Opp pinching with marbles using SF x10 reps  -Table wraps x10 reps  -Picking -up hand full of beads to facilitate full fist/flex and extension L 4th/5th digits  -Table tops x 10 with focus on full active extension  -Grasping handfuls of cold/dried beans to facilitate grasp/release and decrease edema.   -Boading balls clockwise and counter clockwise x 20 ea  -retrieve small pegs and hold in hand to promote 5th and 4 th digit composite flexion  -Use of digit add/abduction to retrieve and stack small sponges  -Retrieve poms with SF only to ^ active composite flexion  -Median Nerve glides x 10 ea,

## 2024-05-06 ENCOUNTER — HOSPITAL ENCOUNTER (OUTPATIENT)
Dept: OCCUPATIONAL THERAPY | Age: 44
Setting detail: THERAPIES SERIES
Discharge: HOME | End: 2024-05-06
Payer: COMMERCIAL

## 2024-05-06 PROCEDURE — 97018 PARAFFIN BATH THERAPY: CPT

## 2024-05-06 PROCEDURE — 97140 MANUAL THERAPY 1/> REGIONS: CPT

## 2024-05-06 PROCEDURE — 97110 THERAPEUTIC EXERCISES: CPT

## 2024-05-06 NOTE — PROGRESS NOTES
SPECIFICS/COMMENTS:   Modality:     Paraffin + MH X -To L hand and wrist + MH x 10 min to promote soft tissue extensibility   Ultrasound  To dorsal aspect of hand to increase circulation, reduce edema, and promote healing  3.3 mHZ  100% Duty cycle  0.8 intensity  8 min   Fluidotherapy  *Affected UE: Completes x 15 min to promote tissue healing, skin desensitization, reduce inflammation, and decrease pain. Actively completed SROM in all available planes with holds at end range during treatment to facilitate improved functional ROM under direct supervision.    AROM:     L hand                        X                             X  -Digit adduction/abduction x 20  -Composite flexion and extension SF & RF x 10  -towel scrunches with digits x 10 ^'d to 20  -Extension lifts SF & RF x 10 ea ^'d to 15  - in hand manipulation focusing on flexion of the 4th and 5th digit to hold large pegs and small pegs while translation from palm to index finger to place into peg board  Wrist ex/flex using 2# wt ball for extension of palm 10 reps  Pro/Sup with digits stretch on 2# ball 10 reps  -Hook, fist, hook and extension x 10 ^'d to 25  -Composite fist around yellow (very light) resistive sponge x 10  -Tendon glides x10 reps  -MCP, PIP and DIP with blocking 4th and fifth digits x15 reps  -Opp pinching with marbles using SF x10 reps  -Table wraps x10 reps  -Picking -up hand full of beads to facilitate full fist/flex and extension L 4th/5th digits  -Table tops x 10 with focus on full active extension  -Grasping handfuls of cold/dried beans to facilitate grasp/release and decrease edema.   -Boading balls clockwise and counter clockwise x 20 ea  -retrieve small pegs and hold in hand to promote 5th and 4 th digit composite flexion  -Use of digit add/abduction to retrieve and stack small sponges  -Retrieve poms with SF only to ^ active composite flexion  -Median Nerve glides x 10 ea, 3 positions with 5 sec holds and instruction in technique

## 2024-05-08 ENCOUNTER — HOSPITAL ENCOUNTER (OUTPATIENT)
Dept: OCCUPATIONAL THERAPY | Age: 44
Setting detail: THERAPIES SERIES
Discharge: HOME | End: 2024-05-08
Payer: COMMERCIAL

## 2024-05-08 PROCEDURE — 97110 THERAPEUTIC EXERCISES: CPT

## 2024-05-08 PROCEDURE — 97140 MANUAL THERAPY 1/> REGIONS: CPT

## 2024-05-08 NOTE — PROGRESS NOTES
and instruction in technique with Fair+ carry over   L wrist    -Flexion, Extension, UD, RD, and sup/pro x 10 ea  -Circumduction with composite fist x 10   AAROM:     L hand    -Composite fist with AAROM and CRC x 10  -Hook fist AAROM then place and hold > full fist x 10 reps 2 sets with emphasis on RF and SF   -AAROM digit 5 composite extension with place and hold x 10        PROM/Stretching:     L wrist X       -Flexion and extension with  progressive movements to toleration  -Prayer stretch x 5 with 10 sec holds, added to HEP  -UD/RD   L digits     X  X  X  X -Digit blocking for digits 4 -5 MCP/PIP/DIP then composite flexion/extension  -Gentle to PIP with support of MCPs  -Gentle to DIPs with support of MCPs  -Gentle into hook fist  -Gentle lumbrical stretch 4th and 5th within tolerance   Scar Mass/Edema Control:     Edema Mob X    -Manual edema mobilization to affected hand throughout  -AROM composite fist and extension with LUE elevated to reduce edema   Soft tissue X     -Soft tissue mobilization + pillar stretch  -Roll palm/wrist over Therabar to promote deep soft tissue mobilization   Strengthening:     LUE             X   X  Gentle grasping with light sponge with mild pain reported. Grasping x10 ^'d to 25, manipulation x10 each: forward and back horizontal and vertical.   -Towel scrunches with 1# ^ 3# dumbbell x 15  -Yellow (light resistive) putty gross grasp and opposition to 5th digit. Good tolerance  -Yellow (light resistive) digit extender x 10 ^'d to 20, 3 sec holds  -Hand gripper set on 100# x 25        Other:     Kinesiology Tape        X  Kinesiology tape application completed for edema/pain management over dorsal mid shaft MCP vertically and dorsal hand horizontally ( 2 web strips sonia crossed over site of edema)   -KT application to CT region with vertical tape 75% pull and horizontal tape 50% pull to open CT space and alleviate symptoms   HEP  -Added AROM wrist exercise (all planes) to be

## 2024-05-13 ENCOUNTER — HOSPITAL ENCOUNTER (OUTPATIENT)
Dept: OCCUPATIONAL THERAPY | Age: 44
Setting detail: THERAPIES SERIES
Discharge: HOME | End: 2024-05-13
Payer: COMMERCIAL

## 2024-05-13 PROCEDURE — 97140 MANUAL THERAPY 1/> REGIONS: CPT

## 2024-05-13 PROCEDURE — 97018 PARAFFIN BATH THERAPY: CPT

## 2024-05-13 PROCEDURE — 97110 THERAPEUTIC EXERCISES: CPT

## 2024-05-13 NOTE — PROGRESS NOTES
Stimulation/Attended  [x] PROM/Stretching                    [x] Fluidotherapy          [x]  Paraffin                   [x] AAROM  [x] AROM                 [] Iontophoresis:   [x] Tendon Glides                                               [] Neuromuscular Re-Ed            [] ADL/IADL re-training    [x] Therapeutic Activity                  [x] Pain Management with/without modalities PRN                 [x] Manual Therapy                      [x] Splinting                                   [] Scar Management                   []Joint Protection/Training  []Ergonomics                             [x] Joint Mobilization                      [] Adaptive Equipment Assessment/Training                             [x] Manual Edema Mobilization   [x] Myofascial Release                 [] Energy Conservation/Work Simplification  [x] GM/FM Coordination                [] Safety retraining/education per  individual diagnosis/goals  [x] Desensitization        Patient Specific Goal: To be able to use his dominant hand functionally                             GOALS (Long term same as Short term): 1/8/24 goal status updated  1) Patient will demonstrate good understanding of home program with good accuracy.   Progressing, HEP updated and reviewed as needed  2) Patient will demonstrate increased active range of motion flexion of their L digits 2- 5 MCP/PIP/DIP by atleast 10* for ADL/IADL completion.Goal Met 2/12, Pt has increased AROM by atleast 10* at each deficit area especially in the 4th and 5th digits. Modified 1/8: Pt will demo increased AROM of their L 4th and 5th digit by atleast 10-15* more to complete ADL/IADL tasks with no difficulty. Progressing, pt is maintaining AROM in digits which is within normal limits   3) Patient will demonstrate increased /pinch strength of at least 10 / 3-5 pinch pounds of their L hand.   Progressing, Strength not assessed at this date due to WB status  4) Patient to report decreased pain

## 2024-05-15 ENCOUNTER — HOSPITAL ENCOUNTER (OUTPATIENT)
Dept: OCCUPATIONAL THERAPY | Age: 44
Setting detail: THERAPIES SERIES
Discharge: HOME | End: 2024-05-15
Payer: COMMERCIAL

## 2024-05-15 PROCEDURE — 97018 PARAFFIN BATH THERAPY: CPT

## 2024-05-15 PROCEDURE — 97110 THERAPEUTIC EXERCISES: CPT

## 2024-05-15 PROCEDURE — 97140 MANUAL THERAPY 1/> REGIONS: CPT

## 2024-05-15 NOTE — PROGRESS NOTES
OCCUPATIONAL THERAPY DAILY TREATMENT NOTE  [facility]  [facility]CCUPATIONAL [facility]  [address] [address]  Dept: [phone]  Loc: [phone]   [facility] fax [phone]      Date:  5/15/2024  Initial Evaluation Date: 23   Evaluating Therapist: TACHO Israel    Patient Name:  Sharita Sharma    :  1980    strictions/Precautions:  Follow conservative metacarpal neck fx protocol, low fall risk  Diagnosis:  S62.337A Displaced fracture of neck of fifth metacarpal bone, left hand, initial encounter for closed    fractured the neck of the L fifth metacarpal                                                      Date of Surgery/Injury: 10/19/23 inj      Insurance/Certification information:  [facility] (30 visits per calendar year)  Plan of care signed (Y/N): N  Visit# / total visits: 12/15- 18 already completed     Referring Practitioner:  Olivier Enriquez  Specific Practitioner Orders: OT Eval & Treat     Assessment of current deficits   [] Functional mobility             [x] ADLs          [x] Strength                 [] Cognition   [] Functional transfers           [x] IADLs         [] Safety Awareness  [x] Endurance   [x] Fine Motor Coordination    [] Balance      [] Vision/perception   [x] Sensation     [x] Gross Motor Coordination [x] ROM          [x] Pain                        [x] Edema          [] Scar Adhesion/Skin Integrity      OT PLAN OF CARE   OT POC based on physician orders, patient diagnosis and results of clinical assessment     Frequency/Duration: 1- 2 / week for 12- 18 visits.   Certification period From: 23  To: 24   UPDATED CERTIFICATION : 1- 2x / week for an additional  15- 20 visits.   Certification period From: 24  To: 24    Specific OT Treatment to include:   [x] Instruction in HEP                   Modalities:  [x] Therapeutic Exercise                 [x] Ultrasound               [] Electrical

## 2024-05-22 ENCOUNTER — TELEPHONE (OUTPATIENT)
Dept: OCCUPATIONAL THERAPY | Age: 44
End: 2024-05-22

## 2024-05-22 NOTE — TELEPHONE ENCOUNTER
Pt did not show for last two OT appointments--called to inform pt of attendance policy and will discharge on 3rd no show

## 2024-05-29 ENCOUNTER — HOSPITAL ENCOUNTER (OUTPATIENT)
Dept: OCCUPATIONAL THERAPY | Age: 44
Setting detail: THERAPIES SERIES
Discharge: HOME | End: 2024-05-29
Payer: COMMERCIAL

## 2024-05-29 PROCEDURE — 97530 THERAPEUTIC ACTIVITIES: CPT

## 2024-05-29 NOTE — PROGRESS NOTES
OCCUPATIONAL THERAPY DISCHARGE  [facility] OCCUPATIONAL THERAPY  [address] [address]  Dept: [phone]  Loc: [phone]   [facility] fax [phone]      Date:  2024  Initial Evaluation Date: 23   Evaluating Therapist: TACHO Israel    Patient Name:  Sharita Sharma    :  1980    strictions/Precautions:  Follow conservative metacarpal neck fx protocol, low fall risk  Diagnosis:  S62.337A Displaced fracture of neck of fifth metacarpal bone, left hand, initial encounter for closed    fractured the neck of the L fifth metacarpal                                                      Date of Surgery/Injury: 10/19/23 inj      Insurance/Certification information:  [facility] (30 visits per calendar year)  Plan of care signed (Y/N): N  Visit# / total visits: 13/15- 18 already completed     Referring Practitioner:  Olivier Enriquez  Specific Practitioner Orders: OT Eval & Treat     Assessment of current deficits   [] Functional mobility             [x] ADLs          [x] Strength                 [] Cognition   [] Functional transfers           [x] IADLs         [] Safety Awareness  [x] Endurance   [x] Fine Motor Coordination    [] Balance      [] Vision/perception   [x] Sensation     [x] Gross Motor Coordination [x] ROM          [x] Pain                        [x] Edema          [] Scar Adhesion/Skin Integrity      OT PLAN OF CARE   OT POC based on physician orders, patient diagnosis and results of clinical assessment     Frequency/Duration: 1- 2 / week for 12- 18 visits.   Certification period From: 23  To: 24   UPDATED CERTIFICATION : 1- 2x / week for an additional  15- 20 visits.   Certification period From: 24  To: 24    Specific OT Treatment to include:   [x] Instruction in HEP                   Modalities:  [x] Therapeutic Exercise                 [x] Ultrasound               [] Electrical

## 2025-01-02 ENCOUNTER — OFFICE VISIT (OUTPATIENT)
Dept: FAMILY MEDICINE CLINIC | Age: 45
End: 2025-01-02

## 2025-01-02 VITALS
DIASTOLIC BLOOD PRESSURE: 70 MMHG | RESPIRATION RATE: 18 BRPM | WEIGHT: 267.4 LBS | SYSTOLIC BLOOD PRESSURE: 120 MMHG | HEART RATE: 94 BPM | BODY MASS INDEX: 35.28 KG/M2 | OXYGEN SATURATION: 98 %

## 2025-01-02 DIAGNOSIS — R05.1 ACUTE COUGH: ICD-10-CM

## 2025-01-02 DIAGNOSIS — R68.89 FLU-LIKE SYMPTOMS: ICD-10-CM

## 2025-01-02 DIAGNOSIS — U07.1 COVID-19: ICD-10-CM

## 2025-01-02 DIAGNOSIS — U07.1 COVID-19: Primary | ICD-10-CM

## 2025-01-02 LAB
INFLUENZA A ANTIBODY: NEGATIVE
INFLUENZA B ANTIBODY: NEGATIVE
Lab: ABNORMAL
PERFORMING INSTRUMENT: ABNORMAL
QC PASS/FAIL: ABNORMAL
SARS-COV-2, POC: DETECTED

## 2025-01-02 RX ORDER — METHYLPREDNISOLONE 4 MG/1
TABLET ORAL
Qty: 1 KIT | Refills: 0 | Status: SHIPPED | OUTPATIENT
Start: 2025-01-02

## 2025-01-02 RX ORDER — BENZONATATE 100 MG/1
100 CAPSULE ORAL 2 TIMES DAILY PRN
Qty: 20 CAPSULE | Refills: 0 | Status: SHIPPED
Start: 2025-01-02 | End: 2025-01-02 | Stop reason: SDUPTHER

## 2025-01-02 RX ORDER — METHYLPREDNISOLONE 4 MG/1
TABLET ORAL
Qty: 1 KIT | Refills: 0 | Status: SHIPPED
Start: 2025-01-02 | End: 2025-01-02 | Stop reason: SDUPTHER

## 2025-01-02 RX ORDER — BENZONATATE 100 MG/1
100 CAPSULE ORAL 2 TIMES DAILY PRN
Qty: 20 CAPSULE | Refills: 0 | Status: SHIPPED | OUTPATIENT
Start: 2025-01-02 | End: 2025-01-09

## 2025-01-02 ASSESSMENT — PATIENT HEALTH QUESTIONNAIRE - PHQ9
SUM OF ALL RESPONSES TO PHQ QUESTIONS 1-9: 0
SUM OF ALL RESPONSES TO PHQ QUESTIONS 1-9: 0
1. LITTLE INTEREST OR PLEASURE IN DOING THINGS: NOT AT ALL
SUM OF ALL RESPONSES TO PHQ QUESTIONS 1-9: 0
2. FEELING DOWN, DEPRESSED OR HOPELESS: NOT AT ALL
SUM OF ALL RESPONSES TO PHQ QUESTIONS 1-9: 0
SUM OF ALL RESPONSES TO PHQ9 QUESTIONS 1 & 2: 0

## 2025-01-02 NOTE — TELEPHONE ENCOUNTER
Medication was sent to the wrong pharmacy. Medication pending with correct pharmacy. Please advise

## 2025-01-02 NOTE — PROGRESS NOTES
Testing:   (All laboratory and radiology results have been personally reviewed by myself)  Labs:  Results for orders placed or performed in visit on 01/02/25   POCT COVID-19, Antigen   Result Value Ref Range    SARS-COV-2, POC Detected (A) Not Detected    Lot Number [id number]     QC Pass/Fail pass     Performing Instrument BD Veritor    POCT Influenza A/B   Result Value Ref Range    Influenza A Ab negative     Influenza B Ab negative        Imaging:  All Radiology results interpreted by Radiologist unless otherwise noted.  No orders to display       Assessment / Plan:   The patient's vitals, allergies, medications, and past medical history have been reviewed.  Sharita was seen today for cough and congestion.    Diagnoses and all orders for this visit:    COVID-19  -     methylPREDNISolone (MEDROL DOSEPACK) 4 MG tablet; Take by mouth.  -     benzonatate (TESSALON) 100 MG capsule; Take 1 capsule by mouth 2 times daily as needed for Cough  -     nirmatrelvir/ritonavir 300/100 (PAXLOVID, 300/100,) 20 x 150 MG & 10 x 100MG TBPK; Nirmatrelvir 300 mg with ritonavir 100 mg, administered together, twice daily by mouth for 5 days.    Acute cough  -     POCT COVID-19, Antigen    Flu-like symptoms  -     POCT Influenza A/B        - Patient is directed to take the prescribed medication as ordered. Discussed taking vitamin D, vitamin C, and zinc supplementation.     - Advised to follow current CDC guidelines. Advised cautionary self-quarantine at home in the interim. Increase fluids and rest. Symptomatic relief discussed including Tylenol prn pain/fever.   Red flag symptoms were discussed with the patient today.  The patient is directed to go the ED if symptoms worsen or change. Pt verbalizes understanding and is in agreement with plan of care. All questions answered.    SIGNATURE: Suzie Dumas DO

## 2025-01-02 NOTE — TELEPHONE ENCOUNTER
Pt also stated you advised he be off work until Monday 01/06/2024 due to covid positive and needs a note. Okay to do? Please advise

## 2025-01-17 ENCOUNTER — TELEPHONE (OUTPATIENT)
Dept: FAMILY MEDICINE CLINIC | Age: 45
End: 2025-01-17

## 2025-01-17 NOTE — TELEPHONE ENCOUNTER
Pt called in stating he is having lower back pain x 2 days. Pt stated he called off work yesterday and today and wanted to be seen. I talked with  and advised pt we have a full schedule and would recommend walk-in or ED for evaluation. Pt was agreeable. Please advise

## 2025-02-24 NOTE — PROGRESS NOTES
Well Adult Note  Name: Sharita Sharma Today’s Date: 4/10/2024   MRN: [id number] Sex: Male   Age: 43 y.o. Ethnicity: Non- / Non    : 1980 Race: Black /       Sharita Sharma is here for well adult exam.  History:  Annual exam:  Patient is here for routine yearly physical/preventative visit.  Patient is  generally healthy.  Chronic medical conditions are generally controlled.   Most recent labs reviewed with patient and  are remarkable.  Health maintenance reviewed with patient and is  up to date. Overall doing well.       Patient is only concern today is that he is still having the pain in his left hand from his boxer's fracture.  He is still going to occupational therapy.  He is seeing the doctor on Friday at [facility].  He states that they are going to be doing another x-ray for him then.  He saw pain management at [facility] but they told him that they did not want to give him any more narcotics or anything like that.  Patient actually quit smoking and quit smoking marijuana as well.  However the marijuana does help with the pain because all he has is Tylenol or ibuprofen which has not really helped with the pain.  He has noticed numbness and tingling in his first 3 fingers of his left hand which started about last week.  I did recommend to ask the doctor on Friday if they thought it was due to carpal tunnel as Occupational Therapy mention to him or if they think it is from his current issue.  If they want to do an EMG or want me to order one that I will order one for the patient. He did quit his job as he is left handed so it is hard for him to work like this. The bone stimulator did not really work well either    Review of Systems   Constitutional:  Negative for chills, fatigue and fever.   HENT:  Negative for congestion, ear pain, postnasal drip and rhinorrhea.    Eyes:  Negative for pain.   Respiratory:  Negative for cough, chest tightness and shortness of breath.   Adequate: hears normal conversation without difficulty

## 2025-03-17 ENCOUNTER — TELEPHONE (OUTPATIENT)
Dept: FAMILY MEDICINE CLINIC | Age: 45
End: 2025-03-17

## 2025-03-17 NOTE — TELEPHONE ENCOUNTER
Pt called in requesting to be see for right knee pain x 1 week. Thinks that he hyper extended it. No openings see advise.

## 2025-03-18 ENCOUNTER — OFFICE VISIT (OUTPATIENT)
Dept: FAMILY MEDICINE CLINIC | Age: 45
End: 2025-03-18
Payer: COMMERCIAL

## 2025-03-18 VITALS
SYSTOLIC BLOOD PRESSURE: 132 MMHG | HEART RATE: 116 BPM | HEIGHT: 73 IN | WEIGHT: 266 LBS | DIASTOLIC BLOOD PRESSURE: 82 MMHG | RESPIRATION RATE: 98 BRPM | OXYGEN SATURATION: 98 % | BODY MASS INDEX: 35.25 KG/M2 | TEMPERATURE: 97.2 F

## 2025-03-18 DIAGNOSIS — S62.307S CLOSED NONDISPLACED FRACTURE OF FIFTH METACARPAL BONE OF LEFT HAND, UNSPECIFIED PORTION OF METACARPAL, SEQUELA: ICD-10-CM

## 2025-03-18 DIAGNOSIS — Z91.09 ENVIRONMENTAL ALLERGIES: ICD-10-CM

## 2025-03-18 DIAGNOSIS — M25.561 ACUTE PAIN OF RIGHT KNEE: Primary | ICD-10-CM

## 2025-03-18 DIAGNOSIS — G89.29 CHRONIC RIGHT SHOULDER PAIN: ICD-10-CM

## 2025-03-18 DIAGNOSIS — E78.00 ELEVATED LDL CHOLESTEROL LEVEL: ICD-10-CM

## 2025-03-18 DIAGNOSIS — E55.9 VITAMIN D DEFICIENCY: ICD-10-CM

## 2025-03-18 DIAGNOSIS — R73.9 HYPERGLYCEMIA: ICD-10-CM

## 2025-03-18 DIAGNOSIS — M25.511 CHRONIC RIGHT SHOULDER PAIN: ICD-10-CM

## 2025-03-18 LAB
ALBUMIN: 4.2 G/DL (ref 3.5–5.2)
ALP BLD-CCNC: 103 U/L (ref 40–129)
ALT SERPL-CCNC: 38 U/L (ref 0–40)
ANION GAP SERPL CALCULATED.3IONS-SCNC: 15 MMOL/L (ref 7–16)
AST SERPL-CCNC: 30 U/L (ref 0–39)
BASOPHILS ABSOLUTE: 0.01 K/UL (ref 0–0.2)
BASOPHILS RELATIVE PERCENT: 0 % (ref 0–2)
BILIRUB SERPL-MCNC: 0.2 MG/DL (ref 0–1.2)
BUN BLDV-MCNC: 14 MG/DL (ref 6–20)
CALCIUM SERPL-MCNC: 9.2 MG/DL (ref 8.6–10.2)
CHLORIDE BLD-SCNC: 105 MMOL/L (ref 98–107)
CHOLESTEROL, TOTAL: 155 MG/DL
CO2: 21 MMOL/L (ref 22–29)
CREAT SERPL-MCNC: 1.2 MG/DL (ref 0.7–1.2)
EOSINOPHILS ABSOLUTE: 0.07 K/UL (ref 0.05–0.5)
EOSINOPHILS RELATIVE PERCENT: 2 % (ref 0–6)
GFR, ESTIMATED: 79 ML/MIN/1.73M2
GLUCOSE BLD-MCNC: 86 MG/DL (ref 74–99)
HBA1C MFR BLD: 5.6 % (ref 4–5.6)
HCT VFR BLD CALC: 42.2 % (ref 37–54)
HDLC SERPL-MCNC: 50 MG/DL
HEMOGLOBIN: 13.7 G/DL (ref 12.5–16.5)
IMMATURE GRANULOCYTES %: 0 % (ref 0–5)
IMMATURE GRANULOCYTES ABSOLUTE: <0.03 K/UL (ref 0–0.58)
LDL CHOLESTEROL: 73 MG/DL
LYMPHOCYTES ABSOLUTE: 1.81 K/UL (ref 1.5–4)
LYMPHOCYTES RELATIVE PERCENT: 40 % (ref 20–42)
MCH RBC QN AUTO: 31.9 PG (ref 26–35)
MCHC RBC AUTO-ENTMCNC: 32.5 G/DL (ref 32–34.5)
MCV RBC AUTO: 98.4 FL (ref 80–99.9)
MONOCYTES ABSOLUTE: 0.35 K/UL (ref 0.1–0.95)
MONOCYTES RELATIVE PERCENT: 8 % (ref 2–12)
NEUTROPHILS ABSOLUTE: 2.31 K/UL (ref 1.8–7.3)
NEUTROPHILS RELATIVE PERCENT: 51 % (ref 43–80)
PDW BLD-RTO: 12.6 % (ref 11.5–15)
PLATELET, FLUORESCENCE: 244 K/UL (ref 130–450)
PMV BLD AUTO: 13.3 FL (ref 7–12)
POTASSIUM SERPL-SCNC: 4.6 MMOL/L (ref 3.5–5)
RBC # BLD: 4.29 M/UL (ref 3.8–5.8)
SODIUM BLD-SCNC: 141 MMOL/L (ref 132–146)
TOTAL PROTEIN: 7.4 G/DL (ref 6.4–8.3)
TRIGL SERPL-MCNC: 158 MG/DL
VITAMIN D 25-HYDROXY: 14.3 NG/ML (ref 30–100)
VLDLC SERPL CALC-MCNC: 32 MG/DL
WBC # BLD: 4.6 K/UL (ref 4.5–11.5)

## 2025-03-18 PROCEDURE — G8427 DOCREV CUR MEDS BY ELIG CLIN: HCPCS | Performed by: STUDENT IN AN ORGANIZED HEALTH CARE EDUCATION/TRAINING PROGRAM

## 2025-03-18 PROCEDURE — 1036F TOBACCO NON-USER: CPT | Performed by: STUDENT IN AN ORGANIZED HEALTH CARE EDUCATION/TRAINING PROGRAM

## 2025-03-18 PROCEDURE — 99214 OFFICE O/P EST MOD 30 MIN: CPT | Performed by: STUDENT IN AN ORGANIZED HEALTH CARE EDUCATION/TRAINING PROGRAM

## 2025-03-18 PROCEDURE — G8417 CALC BMI ABV UP PARAM F/U: HCPCS | Performed by: STUDENT IN AN ORGANIZED HEALTH CARE EDUCATION/TRAINING PROGRAM

## 2025-03-18 RX ORDER — FLUTICASONE PROPIONATE 50 MCG
2 SPRAY, SUSPENSION (ML) NASAL DAILY
Qty: 48 G | Refills: 1 | Status: SHIPPED | OUTPATIENT
Start: 2025-03-18

## 2025-03-18 RX ORDER — TRIAMCINOLONE ACETONIDE 40 MG/ML
40 INJECTION, SUSPENSION INTRA-ARTICULAR; INTRAMUSCULAR ONCE
Status: COMPLETED | OUTPATIENT
Start: 2025-03-18 | End: 2025-03-18

## 2025-03-18 RX ORDER — KETOROLAC TROMETHAMINE 30 MG/ML
30 INJECTION, SOLUTION INTRAMUSCULAR; INTRAVENOUS ONCE
Status: COMPLETED | OUTPATIENT
Start: 2025-03-18 | End: 2025-03-18

## 2025-03-18 RX ORDER — CETIRIZINE HYDROCHLORIDE 10 MG/1
10 TABLET ORAL DAILY
Qty: 90 TABLET | Refills: 1 | Status: SHIPPED | OUTPATIENT
Start: 2025-03-18

## 2025-03-18 RX ADMIN — KETOROLAC TROMETHAMINE 30 MG: 30 INJECTION, SOLUTION INTRAMUSCULAR; INTRAVENOUS at 11:50

## 2025-03-18 RX ADMIN — TRIAMCINOLONE ACETONIDE 40 MG: 40 INJECTION, SUSPENSION INTRA-ARTICULAR; INTRAMUSCULAR at 11:49

## 2025-03-18 SDOH — ECONOMIC STABILITY: FOOD INSECURITY: WITHIN THE PAST 12 MONTHS, THE FOOD YOU BOUGHT JUST DIDN'T LAST AND YOU DIDN'T HAVE MONEY TO GET MORE.: NEVER TRUE

## 2025-03-18 SDOH — ECONOMIC STABILITY: FOOD INSECURITY: WITHIN THE PAST 12 MONTHS, YOU WORRIED THAT YOUR FOOD WOULD RUN OUT BEFORE YOU GOT MONEY TO BUY MORE.: NEVER TRUE

## 2025-03-18 NOTE — PROGRESS NOTES
daily  -     fluticasone (FLONASE) 50 MCG/ACT nasal spray; 2 sprays by Each Nostril route daily    Elevated LDL cholesterol level  -     Lipid Panel    Hyperglycemia  -     Hemoglobin A1C  -     Comprehensive Metabolic Panel  -     CBC with Auto Differential    Vitamin D deficiency  -     Vitamin D 25 Hydroxy    Closed nondisplaced fracture of fifth metacarpal bone of left hand, unspecified portion of metacarpal, sequela  -     XR HAND LEFT (MIN 3 VIEWS); Future      Disposition:  Disposition: Discharge to home.    RICE protocol advised. Nearest ED sooner if symptoms worsen or change. ED immediately with any calf pain/swelling, severe/worsening knee pain, paresthesias, weakness, fever, CP, or SOB. Pt states understanding and is in agreement with this care plan. All questions answered.      Suzie Dumas DO

## 2025-03-20 ENCOUNTER — RESULTS FOLLOW-UP (OUTPATIENT)
Dept: FAMILY MEDICINE CLINIC | Age: 45
End: 2025-03-20

## 2025-03-20 DIAGNOSIS — M25.511 CHRONIC RIGHT SHOULDER PAIN: ICD-10-CM

## 2025-03-20 DIAGNOSIS — M25.561 ACUTE PAIN OF RIGHT KNEE: ICD-10-CM

## 2025-03-20 DIAGNOSIS — M25.561 ACUTE PAIN OF RIGHT KNEE: Primary | ICD-10-CM

## 2025-03-20 DIAGNOSIS — E55.9 VITAMIN D DEFICIENCY: Primary | ICD-10-CM

## 2025-03-20 DIAGNOSIS — G89.29 CHRONIC RIGHT SHOULDER PAIN: ICD-10-CM

## 2025-03-20 RX ORDER — ERGOCALCIFEROL 1.25 MG/1
50000 CAPSULE, LIQUID FILLED ORAL WEEKLY
Qty: 12 CAPSULE | Refills: 1 | Status: SHIPPED | OUTPATIENT
Start: 2025-03-20

## 2025-04-22 ENCOUNTER — HOSPITAL ENCOUNTER (EMERGENCY)
Age: 45
Discharge: HOME | End: 2025-04-22
Payer: COMMERCIAL

## 2025-04-22 ENCOUNTER — APPOINTMENT (OUTPATIENT)
Dept: GENERAL RADIOLOGY | Age: 45
End: 2025-04-22
Payer: COMMERCIAL

## 2025-04-22 VITALS
HEART RATE: 89 BPM | OXYGEN SATURATION: 96 % | RESPIRATION RATE: 20 BRPM | WEIGHT: 264 LBS | SYSTOLIC BLOOD PRESSURE: 138 MMHG | BODY MASS INDEX: 34.84 KG/M2 | DIASTOLIC BLOOD PRESSURE: 79 MMHG | TEMPERATURE: 98.2 F

## 2025-04-22 DIAGNOSIS — S46.911A SHOULDER STRAIN, RIGHT, INITIAL ENCOUNTER: Primary | ICD-10-CM

## 2025-04-22 PROCEDURE — 73030 X-RAY EXAM OF SHOULDER: CPT

## 2025-04-22 PROCEDURE — 6360000002 HC RX W HCPCS: Performed by: PHYSICIAN ASSISTANT

## 2025-04-22 PROCEDURE — 96372 THER/PROPH/DIAG INJ SC/IM: CPT

## 2025-04-22 PROCEDURE — 99211 OFF/OP EST MAY X REQ PHY/QHP: CPT

## 2025-04-22 RX ORDER — DEXAMETHASONE SODIUM PHOSPHATE 10 MG/ML
10 INJECTION, SOLUTION INTRA-ARTICULAR; INTRALESIONAL; INTRAMUSCULAR; INTRAVENOUS; SOFT TISSUE ONCE
Status: COMPLETED | OUTPATIENT
Start: 2025-04-22 | End: 2025-04-22

## 2025-04-22 RX ADMIN — DEXAMETHASONE SODIUM PHOSPHATE 10 MG: 10 INJECTION INTRAMUSCULAR; INTRAVENOUS at 12:17

## 2025-04-22 ASSESSMENT — PAIN - FUNCTIONAL ASSESSMENT: PAIN_FUNCTIONAL_ASSESSMENT: 0-10

## 2025-04-22 ASSESSMENT — PAIN SCALES - GENERAL: PAINLEVEL_OUTOF10: 10

## 2025-04-22 NOTE — DISCHARGE INSTRUCTIONS
Tylenol 650 to 1000 mg every 8 hours as needed for pain/fever.  Ibuprofen 600 mg every 8 hours as needed for inflammation/pain/fever. Take with food and water.

## 2025-04-22 NOTE — ED PROVIDER NOTES
[facility]  EMERGENCY DEPARTMENT ENCOUNTER        NAME: Sharita Sharma  :  1980  MRN:  [id number]  Date of evaluation: 2025  Provider: Antonino Arroyo PA-C  PCP: Suzie Dumas DO  Note Started : 11:39 AM EDT 25    Chief Complaint: Shoulder Injury (Old football injury to right  shoulder. [place] a \"pop\" yesterday while lifting something. )      This is a 44-year-old male who presents to urgent care with family.  He complains of right anterior shoulder pain.  This is not a new problem.  He states pain is worse with movement.  He does state that the other day he was lifting and felt a pop in the right shoulder.  This made his pain worse.  He denies numbness or tingling.  He denies head or neck pain.  He does state history of shoulder problems in the past.  Has a x-ray order from his primary care provider.  He has been taking anti-inflammatory medications.  On first contact patient he appears to be in no acute distress        Review of Systems  Pertinent positives and negatives are stated within HPI, all other systems reviewed and are negative.     Allergies: Lactose intolerance (gi)     --------------------------------------------- PAST HISTORY ---------------------------------------------  Past Medical History:  has a past medical history of Hand fracture, left.    Past Surgical History:  has no past surgical history on file.    Social History:  reports that he has been smoking cigarettes. He has a 5 pack-year smoking history. He has never used smokeless tobacco. He reports current alcohol use. He reports current drug use. Frequency: 7.00 times per week. Drug: Marijuana (Weed).    Family History: family history is not on file.     The patient’s home medications have been reviewed.    The nursing notes within the ED encounter have been reviewed.     ------------------------------------------------SCREENINGS----------------------------------------------

## 2025-04-25 ENCOUNTER — TELEPHONE (OUTPATIENT)
Dept: FAMILY MEDICINE CLINIC | Age: 45
End: 2025-04-25

## 2025-04-25 NOTE — TELEPHONE ENCOUNTER
Patient called in with severe abdominal pain right in the middle.  Vomiting yesterday and this morning.  Diarrhea. Anytime he eats or drinks, he gets severe pain.  Weak.  Today is the 3rd day. He went to work yesterday but was unable to today due to the pain.  I advised him to stay hydrated and eat bland foods and soup.  Advise evaluation?

## 2025-05-03 ENCOUNTER — APPOINTMENT (OUTPATIENT)
Dept: GENERAL RADIOLOGY | Age: 45
End: 2025-05-03
Payer: COMMERCIAL

## 2025-05-03 ENCOUNTER — HOSPITAL ENCOUNTER (EMERGENCY)
Age: 45
Discharge: HOME | End: 2025-05-03
Payer: COMMERCIAL

## 2025-05-03 ENCOUNTER — APPOINTMENT (OUTPATIENT)
Dept: CT IMAGING | Age: 45
End: 2025-05-03
Payer: COMMERCIAL

## 2025-05-03 VITALS
OXYGEN SATURATION: 99 % | SYSTOLIC BLOOD PRESSURE: 129 MMHG | BODY MASS INDEX: 35.12 KG/M2 | WEIGHT: 265 LBS | HEIGHT: 73 IN | TEMPERATURE: 98.1 F | HEART RATE: 100 BPM | RESPIRATION RATE: 17 BRPM | DIASTOLIC BLOOD PRESSURE: 103 MMHG

## 2025-05-03 DIAGNOSIS — S43.402A SPRAIN OF LEFT SHOULDER, UNSPECIFIED SHOULDER SPRAIN TYPE, INITIAL ENCOUNTER: Primary | ICD-10-CM

## 2025-05-03 PROCEDURE — 6370000000 HC RX 637 (ALT 250 FOR IP): Performed by: NURSE PRACTITIONER

## 2025-05-03 PROCEDURE — 73030 X-RAY EXAM OF SHOULDER: CPT

## 2025-05-03 PROCEDURE — 73200 CT UPPER EXTREMITY W/O DYE: CPT

## 2025-05-03 PROCEDURE — 99284 EMERGENCY DEPT VISIT MOD MDM: CPT

## 2025-05-03 RX ORDER — NAPROXEN 500 MG/1
500 TABLET ORAL 2 TIMES DAILY
Qty: 30 TABLET | Refills: 0 | Status: SHIPPED | OUTPATIENT
Start: 2025-05-03

## 2025-05-03 RX ORDER — HYDROCODONE BITARTRATE AND ACETAMINOPHEN 5; 325 MG/1; MG/1
1 TABLET ORAL ONCE
Status: COMPLETED | OUTPATIENT
Start: 2025-05-03 | End: 2025-05-03

## 2025-05-03 RX ADMIN — HYDROCODONE BITARTRATE AND ACETAMINOPHEN 1 TABLET: 5; 325 TABLET ORAL at 15:32

## 2025-05-03 ASSESSMENT — LIFESTYLE VARIABLES
HOW MANY STANDARD DRINKS CONTAINING ALCOHOL DO YOU HAVE ON A TYPICAL DAY: PATIENT DOES NOT DRINK
HOW OFTEN DO YOU HAVE A DRINK CONTAINING ALCOHOL: NEVER
HOW OFTEN DO YOU HAVE A DRINK CONTAINING ALCOHOL: NEVER
HOW MANY STANDARD DRINKS CONTAINING ALCOHOL DO YOU HAVE ON A TYPICAL DAY: PATIENT DOES NOT DRINK

## 2025-05-03 ASSESSMENT — PAIN SCALES - GENERAL
PAINLEVEL_OUTOF10: 10
PAINLEVEL_OUTOF10: 10

## 2025-05-03 NOTE — ED PROVIDER NOTES
Independent NUSRAT Visit.       [facility]  Department of Emergency Medicine   ED  Encounter Note  Admit Date/RoomTime: 5/3/2025  3:53 PM  ED Room: [facility]    NAME: Sharita Sharma  : 1980  MRN: [id number]     Chief Complaint:  Shoulder Injury (Left shoulder pain after falling while on a run, states they believe their left shoulder is dislocated.)    History of Present Illness       Sharita Sharma is a 44 y.o. old male who presents to the emergency department by private vehicle, for traumatic Left shoulder pain which occured a few hour(s) prior to arrival.  The complaint is due to patient was jogging when the concrete was uneven and he tripped and fell causing left shoulder injury.  Patient has no prior history of pain/injury with regards to today's visit.  He is left handed.   Since onset the symptoms have been remaining constant.  His pain is aggraveated by any movement, any use of, or pressure on or palpation of painful area and relieved by nothing, as no treatment has been provided prior to this visit. He denies any head injury, headache, loss of consciousness, confusion, dizziness, neck pain, chest pain, abdominal pain, back pain, numbness, weakness, blurred vision, nausea, vomiting, fever, chills, wounds, or rash.    ROS   Pertinent positives and negatives are stated within HPI, all other systems reviewed and are negative.    Past Medical History:  has a past medical history of Hand fracture, left.    Surgical History:  has no past surgical history on file.    Social History:  reports that he has been smoking cigarettes. He has a 5 pack-year smoking history. He has never used smokeless tobacco. He reports current alcohol use. He reports current drug use. Frequency: 7.00 times per week. Drug: Marijuana (Weed).    Family History: family history is not on file.     Allergies: Lactose intolerance (gi)    Physical Exam   Oxygen Saturation Interpretation: Normal.        ED Triage Vitals  Interpretation: Normal.        ED Triage Vitals   BP Systolic BP Percentile Diastolic BP Percentile Temp Temp src Pulse Respirations SpO2   05/03/25 1528 -- -- -- -- 05/03/25 1528 05/03/25 1528 05/03/25 1528   (!) 129/103     100 17 99 %      Height Weight - Scale         05/03/25 1519 05/03/25 1519         1.854 m (6' 1\") 120.2 kg (265 lb)               Constitutional:  Alert, development consistent with age.  Neck:  Normal ROM.  Supple. Non-tender midline and paraspinal  Respiratory-easy unlabored.  Chest measures in the symmetrical.  Lung sounds are clear  Cardiac-regular rhythm and rate  Physical Exam  Left Shoulder: .              Tenderness: moderate              Swelling: None.            Deformity: no deformity observed/palpated.              ROM: diminished range with pain.             Skin:  no wounds or erythema.        Neurovascular:              Motor deficit: none.             Sensory deficit: none.              Pulse deficit: none.              Capillary refill: normal.    Left Elbow: diffusely across elbow            Tenderness:  none.              Swelling: None.            Deformity: no deformity observed/palpated.             ROM: full painless ROM.             Skin:  no wounds, erythema, or swelling.  Lymphatics: No lymphangitis or edema noted  Neurological:  Oriented.  Motor functions intact.    Lab / Imaging Results   (All laboratory and radiology results have been personally reviewed by myself)  Labs:  No results found for this visit on 05/03/25.  Imaging:  All Radiology results interpreted by Radiologist unless otherwise noted.  XR SHOULDER LEFT (MIN 2 VIEWS)    (Results Pending)     ED Course / Medical Decision Making     Medications   HYDROcodone-acetaminophen (NORCO) 5-325 MG per tablet 1 tablet (has no administration in time range)        Re-examination:  5/3/25       Time: ***    Consult(s):   None    Procedure(s):   {:47704::None}    Medical Decision Making    Patient presents to the ER

## 2025-05-03 NOTE — DISCHARGE INSTRUCTIONS
Call orthopedics on Monday to make follow-up appointment    Return with any worsening pain or new symptoms

## 2025-05-03 NOTE — ED PROVIDER NOTES
Independent NUSRAT Visit.      PATIENT TURNOVER:  5/3/25  6:11 PM EDT:    I received this patient at sign out from Charles Sweeney, pending CT.  I have introduced my self to the patient / family members present currently and have answered their questions to this point to the best of my ability with the information available to me up to now.  I have discussed the patient's initial exam, treatment and plan of care with the out going physician.    I have examined the patient myself and reviewed ordered tests / medications and reviewed any available results to this point.    This is a 44-year-old male presents emergency department with left shoulder pain after having a mechanical fall prior to arrival.  CT scan reveals no acute abnormality.  There is soft tissue edema.  There is also a small foci of soft tissue gas versus fat density.  Patient has no open wounds, rash, and has not had any recent injections.  This is likely a fat density.  Patient has no signs of infection.  No crepitus on exam.  He was advised to follow-up with orthopedics as well as his family doctor.  Sling applied by RN.  MSPs present before and after application.  RICE discussed.  Patient educated on signs symptoms that would require his emergent return to the ED.    Impression:  1. Sprain of left shoulder, unspecified shoulder sprain type, initial encounter      Electronically signed by Nikole Carney PA-C   DD: 5/3/25    *This report was transcribed using voice recognition software. Every effort was made to ensure accuracy; however, inadvertent computerized transcription errors may be present.    END OF ED PROVIDER NOTE        Nikole Carney PA-C  05/03/25 2011

## 2025-05-20 ENCOUNTER — OFFICE VISIT (OUTPATIENT)
Dept: FAMILY MEDICINE CLINIC | Age: 45
End: 2025-05-20
Payer: COMMERCIAL

## 2025-05-20 VITALS
DIASTOLIC BLOOD PRESSURE: 72 MMHG | BODY MASS INDEX: 34.25 KG/M2 | OXYGEN SATURATION: 98 % | WEIGHT: 258.4 LBS | TEMPERATURE: 98.2 F | HEART RATE: 100 BPM | HEIGHT: 73 IN | SYSTOLIC BLOOD PRESSURE: 120 MMHG

## 2025-05-20 DIAGNOSIS — M25.512 ACUTE PAIN OF LEFT SHOULDER: Primary | ICD-10-CM

## 2025-05-20 PROCEDURE — 4004F PT TOBACCO SCREEN RCVD TLK: CPT | Performed by: STUDENT IN AN ORGANIZED HEALTH CARE EDUCATION/TRAINING PROGRAM

## 2025-05-20 PROCEDURE — G8427 DOCREV CUR MEDS BY ELIG CLIN: HCPCS | Performed by: STUDENT IN AN ORGANIZED HEALTH CARE EDUCATION/TRAINING PROGRAM

## 2025-05-20 PROCEDURE — 99214 OFFICE O/P EST MOD 30 MIN: CPT | Performed by: STUDENT IN AN ORGANIZED HEALTH CARE EDUCATION/TRAINING PROGRAM

## 2025-05-20 PROCEDURE — G8417 CALC BMI ABV UP PARAM F/U: HCPCS | Performed by: STUDENT IN AN ORGANIZED HEALTH CARE EDUCATION/TRAINING PROGRAM

## 2025-05-20 RX ORDER — LEG BRACE
1 EACH MISCELLANEOUS DAILY
Qty: 1 EACH | Refills: 0 | Status: SHIPPED | OUTPATIENT
Start: 2025-05-20

## 2025-05-20 RX ORDER — TRIAMCINOLONE ACETONIDE 40 MG/ML
40 INJECTION, SUSPENSION INTRA-ARTICULAR; INTRAMUSCULAR ONCE
Status: COMPLETED | OUTPATIENT
Start: 2025-05-20 | End: 2025-05-20

## 2025-05-20 RX ORDER — KETOROLAC TROMETHAMINE 30 MG/ML
30 INJECTION, SOLUTION INTRAMUSCULAR; INTRAVENOUS ONCE
Status: COMPLETED | OUTPATIENT
Start: 2025-05-20 | End: 2025-05-20

## 2025-05-20 RX ADMIN — TRIAMCINOLONE ACETONIDE 40 MG: 40 INJECTION, SUSPENSION INTRA-ARTICULAR; INTRAMUSCULAR at 13:33

## 2025-05-20 RX ADMIN — KETOROLAC TROMETHAMINE 30 MG: 30 INJECTION, SOLUTION INTRAMUSCULAR; INTRAVENOUS at 13:34

## 2025-05-20 ASSESSMENT — ENCOUNTER SYMPTOMS
CONSTIPATION: 0
EYE PAIN: 0
COUGH: 0
NAUSEA: 0
VOMITING: 0
BACK PAIN: 0
RHINORRHEA: 0
DIARRHEA: 0
SHORTNESS OF BREATH: 0
ABDOMINAL PAIN: 0
CHEST TIGHTNESS: 0

## 2025-05-20 NOTE — PROGRESS NOTES
5/20/2025    [facility]  Chief Complaint   Patient presents with    Shoulder Pain     -left shoulder  -went to ER on 5/03       Sharita Sharma is a 44 y.o. male who  has a past medical history of Hand fracture, left.     History of Present Illness  The patient presents for evaluation of left shoulder pain.    He reports experiencing pain in his left shoulder following a fall during which he attempted to brace himself. He recalls hearing a popping sound at the time of the incident. Despite initial attempts to dismiss the discomfort, he sought medical attention later that day due to persistent soreness and swelling. At the emergency room, a CT scan and x-ray were performed, both of which revealed no fractures.     He missed the initial occupational therapy appointment and is requesting a referral to a closer facility for further therapy. He has not received any injections for his condition and expresses reluctance towards steroid injections. He has been prescribed muscle relaxants, which he takes twice daily, once in the morning and once at night. These medications provide some relief but also cause drowsiness.    He has a history of a similar injury to his right shoulder while playing football, which was managed with an injection. Currently, he reports that the pain in his left shoulder has shifted from the initial site to the back of the shoulder and sometimes affects his ability to  objects.       Review of Systems   Constitutional:  Negative for chills, fatigue and fever.   HENT:  Negative for congestion, ear pain, postnasal drip and rhinorrhea.    Eyes:  Negative for pain.   Respiratory:  Negative for cough, chest tightness and shortness of breath.    Cardiovascular:  Negative for chest pain.   Gastrointestinal:  Negative for abdominal pain, constipation, diarrhea, nausea and vomiting.   Genitourinary:  Negative for difficulty urinating, dysuria and frequency.   Musculoskeletal:  Positive for arthralgias.

## 2025-06-21 ENCOUNTER — HOSPITAL ENCOUNTER (OUTPATIENT)
Dept: MRI IMAGING | Age: 45
Discharge: HOME | End: 2025-06-23
Payer: COMMERCIAL

## 2025-06-21 DIAGNOSIS — M25.512 ACUTE PAIN OF LEFT SHOULDER: ICD-10-CM

## 2025-06-21 PROCEDURE — 73221 MRI JOINT UPR EXTREM W/O DYE: CPT

## 2025-06-25 ENCOUNTER — TELEPHONE (OUTPATIENT)
Dept: FAMILY MEDICINE CLINIC | Age: 45
End: 2025-06-25

## 2025-06-25 ENCOUNTER — RESULTS FOLLOW-UP (OUTPATIENT)
Dept: FAMILY MEDICINE CLINIC | Age: 45
End: 2025-06-25

## 2025-06-25 DIAGNOSIS — M75.102 TEAR OF LEFT SUPRASPINATUS TENDON: Primary | ICD-10-CM

## 2025-06-25 DIAGNOSIS — S46.812S TEAR OF LEFT INFRASPINATUS TENDON, SEQUELA: ICD-10-CM

## 2025-06-25 NOTE — TELEPHONE ENCOUNTER
He is only taking the naproxen and muscle relaxant.  He stated he was fine up until the MRI then had pain during and now after MRI.

## 2025-06-25 NOTE — TELEPHONE ENCOUNTER
Pt had MRI today and is in pain he is calling to see if something can be sent to pharmacy for the pain he said the process of getting the MRI def made the pain worse today

## 2025-06-25 NOTE — TELEPHONE ENCOUNTER
We can set up patient for kenalog/toradal and I can change muscle relaxant to see if it will help more if willing

## 2025-06-26 ENCOUNTER — CLINICAL SUPPORT (OUTPATIENT)
Dept: FAMILY MEDICINE CLINIC | Age: 45
End: 2025-06-26
Payer: COMMERCIAL

## 2025-06-26 DIAGNOSIS — S46.911A SHOULDER STRAIN, RIGHT, INITIAL ENCOUNTER: ICD-10-CM

## 2025-06-26 DIAGNOSIS — S46.812S TEAR OF LEFT INFRASPINATUS TENDON, SEQUELA: ICD-10-CM

## 2025-06-26 DIAGNOSIS — M75.102 TEAR OF LEFT SUPRASPINATUS TENDON: Primary | ICD-10-CM

## 2025-06-26 PROCEDURE — 96372 THER/PROPH/DIAG INJ SC/IM: CPT | Performed by: STUDENT IN AN ORGANIZED HEALTH CARE EDUCATION/TRAINING PROGRAM

## 2025-06-26 RX ORDER — TRIAMCINOLONE ACETONIDE 40 MG/ML
40 INJECTION, SUSPENSION INTRA-ARTICULAR; INTRAMUSCULAR ONCE
Status: COMPLETED | OUTPATIENT
Start: 2025-06-26 | End: 2025-06-26

## 2025-06-26 RX ORDER — NAPROXEN 500 MG/1
500 TABLET ORAL 2 TIMES DAILY
Qty: 180 TABLET | Refills: 0 | Status: SHIPPED | OUTPATIENT
Start: 2025-06-26

## 2025-06-26 RX ORDER — KETOROLAC TROMETHAMINE 30 MG/ML
30 INJECTION, SOLUTION INTRAMUSCULAR; INTRAVENOUS ONCE
Status: COMPLETED | OUTPATIENT
Start: 2025-06-26 | End: 2025-06-26

## 2025-06-26 RX ADMIN — TRIAMCINOLONE ACETONIDE 40 MG: 40 INJECTION, SUSPENSION INTRA-ARTICULAR; INTRAMUSCULAR at 15:13

## 2025-06-26 RX ADMIN — KETOROLAC TROMETHAMINE 30 MG: 30 INJECTION, SOLUTION INTRAMUSCULAR; INTRAVENOUS at 15:13

## 2025-06-26 NOTE — TELEPHONE ENCOUNTER
Name of Medication(s) Requested:  Requested Prescriptions     Pending Prescriptions Disp Refills    naproxen (NAPROSYN) 500 MG tablet 180 tablet 0     Sig: Take 1 tablet by mouth 2 times daily    tiZANidine (ZANAFLEX) 4 MG tablet 180 tablet 1     Sig: Take 1 tablet by mouth 2 times daily as needed (pain/spasm (may cause drowsiness))       Medication is on current medication list Yes    Dosage and directions were verified? Yes    Quantity verified: 90 day supply     Pharmacy Verified?  Yes    Last Appointment:  5/20/2025    Future appts:  Future Appointments   Date Time Provider Department Center   6/30/2025  8:00 AM Norma Melvin OT [facility]   8/20/2025 11:00 AM Suzie Dumas DO [facility]        (If no appt send self scheduling link. .REFILLAPPT)  Scheduling request sent?     [] Yes  [x] No    Does patient need updated?  [] Yes  [x] No

## 2025-06-30 ENCOUNTER — HOSPITAL ENCOUNTER (OUTPATIENT)
Dept: OCCUPATIONAL THERAPY | Age: 45
Setting detail: THERAPIES SERIES
Discharge: HOME | End: 2025-06-30
Payer: COMMERCIAL

## 2025-06-30 PROCEDURE — 97165 OT EVAL LOW COMPLEX 30 MIN: CPT

## 2025-06-30 NOTE — PROGRESS NOTES
OCCUPATIONAL THERAPY INITIAL EVALUATION    [facility]  [facility]  [address] [address]  Dept: [phone]  Loc: [phone]   [facility] OT Fax: [phone]    Date:  2025  Initial Evaluation Date: 25   Evaluating Therapist: Norma Melvin OT    Patient Name:  Sharita Sharma    :  1980    Restrictions/Precautions:  Per rotator cuff tear conservative protocol  Diagnosis:  M25.512 (ICD-10-CM) - Acute pain of left shoulder        Date of Surgery/Injury: 5/3/25    Insurance/Certification information: [facility] (8 V then auth)  Plan of care signed (Y/N): N  Visit# / total visits:     Referring Practitioner: Suzie Dumas DO   Specific Practitioner Orders: OT Eval & Treat    Assessment of current deficits   [] Functional mobility  [x] ADLs  [x] Strength   [] Cognition   [] Functional transfers   [x] IADLs  [] Safety Awareness  [x] Endurance   [] Fine Motor Coordination  [] Balance  [] Vision/perception  [] Sensation    [x] Gross Motor Coordination [x] ROM  [x] Pain   [] Edema    [] Scar Adhesion/Skin Integrity     OT PLAN OF CARE   OT POC based on physician orders, patient diagnosis and results of clinical assessment    Frequency/Duration: Frequency/Duration 1-2x / week for 8 visits.   Certification period From: 2025   To: 25    Specific OT Treatment to include:   [x] Instruction in HEP                   Modalities:  [x] Therapeutic Exercise        [x] Ultrasound               [x] Electrical Stimulation/Attended  [x] PROM/Stretching                    [] Fluidotherapy          []  Paraffin                   [x] AAROM  [x] AROM                 [] Iontophoresis:   [] Tendon Glides                                               [] Neuromuscular Re-Ed            [x] ADL/IADL re-training    [x] Therapeutic Activity       [x] Pain Management with/without modalities PRN                 [x] Manual Therapy                      []